# Patient Record
Sex: MALE | Race: WHITE | NOT HISPANIC OR LATINO | Employment: FULL TIME | URBAN - METROPOLITAN AREA
[De-identification: names, ages, dates, MRNs, and addresses within clinical notes are randomized per-mention and may not be internally consistent; named-entity substitution may affect disease eponyms.]

---

## 2018-08-04 NOTE — PROGRESS NOTES
Date of Office Visit: 08/10/2018  Encounter Provider: Bart Ellis MD  Place of Service: [facility] CARDIOLOGY  Patient Name: Bob Guerrero  :1970    Chief Complaint   Patient presents with   • Chest Pain   :     HPI: Bob Guerrero is a 48 y.o. male who presents today in consultation for chest pain at the request of Dr. Saeed.      He denies any history of CAD or CHF.  He had SVT as a child, and underwent ablation in  followed by a repeat ablation in  (he was told that he had four separate foci, and that only two were addressed the first time).  He has not had any SVT since then; he has rare isolated palpitations.    He denies any family history of heart disease.  He has hyperlipidemia but is untreated.  He was started on lisinopril for elevated blood pressure but had to stop it due to fatigue.    His biggest issue is chronic fatigue.  He is sedentary, and obese.  He does not smoke.  He has had intermittent chest tightness, which is not related to exertion.  He can't tell if it's due to a recent bronchitis or GERD.  He has exertional dyspnea which is mild-moderate.  He denies edema, orthopnea, or PND.  He denies syncope or near syncope.     Past Medical History:   Diagnosis Date   • Acid reflux    • Hyperlipidemia    • Morbid obesity with BMI of 40.0-44.9, adult (CMS/[facility])    • SVT (supraventricular tachycardia) (CMS/[facility])     s/p ablation in  and        Past Surgical History:   Procedure Laterality Date   • ABLATION OF DYSRHYTHMIC FOCUS     • TONSILLECTOMY         Social History     Social History   • Marital status:      Spouse name: N/A   • Number of children: N/A   • Years of education: N/A     Occupational History   • Not on file.     Social History Main Topics   • Smoking status: Never Smoker   • Smokeless tobacco: Never Used      Comment: caffeine use: 4-5 cups daily and soft drinks throughout the day.    • Alcohol use Yes      Comment:  "occ   • Drug use: No   • Sexual activity: No     Other Topics Concern   • Not on file     Social History Narrative   • No narrative on file       Family History   Problem Relation Age of Onset   • Diabetes Mother    • No Known Problems Father        Review of Systems   Constitution: Positive for malaise/fatigue.   Cardiovascular: Positive for chest pain and dyspnea on exertion. Negative for palpitations.   Endocrine: Positive for polyuria.   Skin: Positive for rash.   Musculoskeletal: Positive for myalgias.   Genitourinary: Positive for frequency.   Neurological: Positive for excessive daytime sleepiness.   All other systems reviewed and are negative.      No Known Allergies      Current Outpatient Prescriptions:   •  omeprazole (priLOSEC) 20 MG capsule, Take 20 mg by mouth Daily., Disp: , Rfl:       Objective:     Vitals:    08/10/18 1004   BP: 130/86   BP Location: Left arm   Pulse: 84   Weight: 112 kg (247 lb 3.2 oz)   Height: 165.1 cm (65\")     Body mass index is 41.14 kg/m².    Physical Exam   Constitutional: He is oriented to person, place, and time.   Obese   HENT:   Head: Normocephalic.   Nose: Nose normal.   Mouth/Throat: Oropharynx is clear and moist.   Eyes: Pupils are equal, round, and reactive to light. Conjunctivae and EOM are normal.   Neck: Normal range of motion.   Cannot assess for JVD due to habitus   Cardiovascular: Normal rate, regular rhythm, normal heart sounds and intact distal pulses.    No murmur heard.  Pulmonary/Chest: Effort normal.   Abdominal: Soft.   Obesity limits abdominal exam   Musculoskeletal: Normal range of motion. He exhibits no edema.   Neurological: He is alert and oriented to person, place, and time. No cranial nerve deficit.   Skin: Skin is warm and dry. No rash noted.   Psychiatric: He has a normal mood and affect. His behavior is normal. Judgment and thought content normal.   Vitals reviewed.        ECG 12 Lead  Date/Time: 8/10/2018 11:18 AM  Performed by: JILLIAN CAI " D  Authorized by: BART ELLIS   Comparison: not compared with previous ECG   Previous ECG: no previous ECG available  Rhythm: sinus rhythm  Conduction: conduction normal  ST Segments: ST segments normal  T Waves: T waves normal  QRS axis: normal  Other: no other findings                Assessment:       Diagnosis Plan   1. Chest discomfort  Treadmill Stress Test   2. Fatigue, unspecified type     3. History of paroxysmal supraventricular tachycardia     4. Elevated blood pressure reading without diagnosis of hypertension            Plan:       Overall, his chest discomfort is atypical.  He has generalized fatigue but is sedentary.  He does have significant hyperlipidemia, and I encouraged him to discuss this with Dr. Saeed as I feel he would benefit from a statin.  I had him complete a treadmill stress today, and he exercised for 9.5 minutes without EKG changes or chest pain.  His dyspnea was appropriate for the degree of exertion.  I do not feel he needs further cardiac evaluation.    His BP is minimally elevated today, but has been significantly higher in the past.  He stopped lisinopril due to fatigue; I'll defer this to Dr. Saeed.    He's had no evidence of recurrent SVT and is most likely cured s/p ablation.     Sincerely,       Bart Ellis MD

## 2018-08-10 ENCOUNTER — OFFICE VISIT (OUTPATIENT)
Dept: CARDIOLOGY | Facility: CLINIC | Age: 48
End: 2018-08-10

## 2018-08-10 ENCOUNTER — HOSPITAL ENCOUNTER (OUTPATIENT)
Dept: CARDIOLOGY | Facility: HOSPITAL | Age: 48
Discharge: HOME | End: 2018-08-10
Attending: INTERNAL MEDICINE | Admitting: INTERNAL MEDICINE

## 2018-08-10 VITALS
BODY MASS INDEX: 41.19 KG/M2 | HEART RATE: 84 BPM | HEIGHT: 65 IN | WEIGHT: 247.2 LBS | DIASTOLIC BLOOD PRESSURE: 86 MMHG | SYSTOLIC BLOOD PRESSURE: 130 MMHG

## 2018-08-10 DIAGNOSIS — R07.89 CHEST DISCOMFORT: ICD-10-CM

## 2018-08-10 DIAGNOSIS — R03.0 ELEVATED BLOOD PRESSURE READING WITHOUT DIAGNOSIS OF HYPERTENSION: ICD-10-CM

## 2018-08-10 DIAGNOSIS — Z86.79 HISTORY OF PAROXYSMAL SUPRAVENTRICULAR TACHYCARDIA: ICD-10-CM

## 2018-08-10 DIAGNOSIS — R53.83 FATIGUE, UNSPECIFIED TYPE: ICD-10-CM

## 2018-08-10 DIAGNOSIS — R07.89 CHEST DISCOMFORT: Primary | ICD-10-CM

## 2018-08-10 LAB
BH CV STRESS BP STAGE 1: NORMAL
BH CV STRESS BP STAGE 2: NORMAL
BH CV STRESS BP STAGE 3: NORMAL
BH CV STRESS DURATION MIN STAGE 1: 3
BH CV STRESS DURATION MIN STAGE 2: 3
BH CV STRESS DURATION MIN STAGE 3: 3
BH CV STRESS DURATION SEC STAGE 1: 0
BH CV STRESS DURATION SEC STAGE 2: 0
BH CV STRESS DURATION SEC STAGE 3: 0
BH CV STRESS DURATION SEC STAGE 4: 30
BH CV STRESS GRADE STAGE 1: 10
BH CV STRESS GRADE STAGE 2: 12
BH CV STRESS GRADE STAGE 3: 14
BH CV STRESS GRADE STAGE 4: 16
BH CV STRESS HR STAGE 1: 124
BH CV STRESS HR STAGE 2: 136
BH CV STRESS HR STAGE 3: 164
BH CV STRESS HR STAGE 4: 169
BH CV STRESS METS STAGE 1: 5
BH CV STRESS METS STAGE 2: 7.5
BH CV STRESS METS STAGE 3: 10
BH CV STRESS METS STAGE 4: 13.5
BH CV STRESS PROTOCOL 1: NORMAL
BH CV STRESS RECOVERY BP: NORMAL MMHG
BH CV STRESS RECOVERY HR: 110 BPM
BH CV STRESS SPEED STAGE 1: 1.7
BH CV STRESS SPEED STAGE 2: 2.5
BH CV STRESS SPEED STAGE 3: 3.4
BH CV STRESS SPEED STAGE 4: 4.2
BH CV STRESS STAGE 1: 1
BH CV STRESS STAGE 2: 2
BH CV STRESS STAGE 3: 3
BH CV STRESS STAGE 4: 4
MAXIMAL PREDICTED HEART RATE: 173 BPM
PERCENT MAX PREDICTED HR: 97.69 %
STRESS BASELINE BP: NORMAL MMHG
STRESS BASELINE HR: 86 BPM
STRESS PERCENT HR: 115 %
STRESS POST ESTIMATED WORKLOAD: 10 METS
STRESS POST EXERCISE DUR MIN: 9 MIN
STRESS POST EXERCISE DUR SEC: 30 SEC
STRESS POST PEAK BP: NORMAL MMHG
STRESS POST PEAK HR: 169 BPM
STRESS TARGET HR: 147 BPM

## 2018-08-10 PROCEDURE — 93000 ELECTROCARDIOGRAM COMPLETE: CPT | Performed by: INTERNAL MEDICINE

## 2018-08-10 PROCEDURE — 99244 OFF/OP CNSLTJ NEW/EST MOD 40: CPT | Performed by: INTERNAL MEDICINE

## 2018-08-10 PROCEDURE — 93017 CV STRESS TEST TRACING ONLY: CPT

## 2018-08-10 PROCEDURE — 93018 CV STRESS TEST I&R ONLY: CPT | Performed by: INTERNAL MEDICINE

## 2018-08-10 PROCEDURE — 93016 CV STRESS TEST SUPVJ ONLY: CPT | Performed by: INTERNAL MEDICINE

## 2018-10-02 ENCOUNTER — OFFICE VISIT (OUTPATIENT)
Dept: SLEEP MEDICINE | Facility: HOSPITAL | Age: 48
End: 2018-10-02
Attending: INTERNAL MEDICINE

## 2018-10-02 VITALS
SYSTOLIC BLOOD PRESSURE: 139 MMHG | BODY MASS INDEX: 41.15 KG/M2 | HEART RATE: 101 BPM | WEIGHT: 247 LBS | HEIGHT: 65 IN | DIASTOLIC BLOOD PRESSURE: 84 MMHG | OXYGEN SATURATION: 95 %

## 2018-10-02 DIAGNOSIS — R06.83 SNORING: Primary | ICD-10-CM

## 2018-10-02 DIAGNOSIS — G47.33 OSA (OBSTRUCTIVE SLEEP APNEA): ICD-10-CM

## 2018-10-02 DIAGNOSIS — E66.01 MORBID OBESITY (HCC): ICD-10-CM

## 2018-10-02 DIAGNOSIS — F51.04 CHRONIC INSOMNIA: ICD-10-CM

## 2018-10-02 PROCEDURE — G0463 HOSPITAL OUTPT CLINIC VISIT: HCPCS

## 2018-10-02 NOTE — PROGRESS NOTES
[facility]- Sleep Disorders Center      Patient Care Team:  Everett Saeed MD as PCP - General (Family Medicine)    Referring Provider: Everett Saeed MD    Chief complaint:   Daytime fatigue and loud snoring    History of present illness:     this is a 48-year-old male patient with history of acid reflux who presented for evaluation of daytime fatigue and snoring.  He also had issues with her throat upon awakening and was recently seen by ENT who advised him to get tested for sleep apnea.    Patient's main complain are fatigue and trouble falling asleep.    He acknowledged loud snoring which sometimes awakens him and disturb his wife.  He reported awakening occasionally choking and gasping.  He also stated that he wakes up with a dry mouth and usually experience acid reflux at night but he always have attributed it to his diet.  He grinds his teeth at night.  He denies symptoms of restless leg syndrome.    Sleep schedule:  -Bedtime: 11 PM  -Sleep latency: couple of hours. He has mindracing and sometimes uses his cell phone. Sometimes he takes Melatonin 3mg which helps.   -Wake up time: 8:30 AM weekdays and 9 AM weekend , does not feel refreshed  -Nocturnal awakenin-2 times because of nocturia.  No difficulties going back to sleep.  -Perceived sleep hours: 5-7    ESS: Total score: 7     He's gained about 15 pounds over the last 5 years.  He works as a CPA.  He does not exercise regularly and does not watch his diet.    Review of Systems  Constitutional: No fever or chills. No changes in appetite.   ENMT: No sinus congestion or hoarsness but reported postnasal drip  Cardiovascular: No chest pain, palpitation or legs swelling.    Respiratory: No dyspnea or wheezing but reported intermittent cough  Gastrointestinal: No constipation, diarrhea, abdominal pain but acid reflux  Neurology: No headache, weakness, numbness or dizziness.   Musculoskeletal: No joints pain,  "stiffness or swelling.   Psychiatry: No depression, anxiety or irritability.   Hem/Lymphatic: No swollen glands or easy bruising.  Integumentary: No rash.  Endocrinology: No excessive thirst, cold or warm intolerance.   Urinary: No dysuria or bloody urine but frequent urination.     History  Past Medical History:   Diagnosis Date   • Acid reflux    • Hyperlipidemia    • Morbid obesity with BMI of 40.0-44.9, adult (CMS/[facility])    • SVT (supraventricular tachycardia) (CMS/[facility])     s/p ablation in 1996 and 1997   ,   Past Surgical History:   Procedure Laterality Date   • ABLATION OF DYSRHYTHMIC FOCUS     • TONSILLECTOMY     ,   Family History   Problem Relation Age of Onset   • Diabetes Mother    • No Known Problems Father    ,   Social History   Substance Use Topics   • Smoking status: Never Smoker   • Smokeless tobacco: Never Used      Comment: caffeine use: 4-5 cups daily and soft drinks throughout the day.    • Alcohol use Yes      Comment: occ    and Allergies:  Patient has no known allergies.    Medications:    Current Outpatient Prescriptions:   •  omeprazole (priLOSEC) 20 MG capsule, Take 20 mg by mouth Daily., Disp: , Rfl:       Vital Signs:  Vitals:    10/02/18 1619   BP: 139/84   BP Location: Left arm   Patient Position: Sitting   Pulse: 101   SpO2: 95%   Weight: 112 kg (247 lb)   Height: 165.1 cm (65\")     Body mass index is 41.1 kg/m².  Neck Circumference: 18 inches     Physical Exam:  Neck Circumference: 18 inches     Constitutional: Not in acute distress.  Eyes: Injected conjunctiva, EOMI.  ENMT: Rich score 3. Mallampati score 4. No oral thrush. Tonsils grade 1. Large tongue.  Neck: Large. No thyromegaly.    Heart: Regular rhythm and rate, no murmur  Lungs: Good and equal air entry bilaterally. No crackles or wheezing.         Abdomen: Obese.   Extremities: No cyanosis, clubbing or pitting edema. Moves all extremities.  Neuro: Conscious, alert, oriented x3.   Psych: Appropriate mood and affect.  "   Integumentary: No rash  Lymphatic: No palpable cervical or supraclavicular lymph nodes.    Diagnostic data:  Treadmill stress test on 8/10/18 was negative for ischemia       Assessment:  Diagnoses and all orders for this visit:    Snoring    Morbid obesity (CMS/HCC)    Chronic insomnia      Patient is at high risk for obstructive sleep apnea however he has comorbid insomnia and also morbid obesity which limit the utility of home sleep apnea testing.    Plan:    Check a polysomnography (split protocol).  Patient is not a good candidate for home sleep apnea testing due to comorbid insomnia and morbid obesity.  I explained the pathology of sleep apnea to the patient, testing and therapy which mainly include CPAP.  Patient is agreeable with CPAP treatment if needed.    We discussed sleep hygiene at length.  Advised the patient to avoid going to bed when not feeling sleepy and could not stay in bed more than 20 minutes if unable to fall asleep.  He has both psychophysiological and inadequate sleep hygiene and insomnia.  We talked about relaxation techniques and avoiding electronic/TV in bed.    I counseled the patient for weight loss.  I informed him that losing weight may decrease the severity of sleep apnea and alveolar the need of CPAP treatment.  I encouraged him to exercise daily and watch his diet.        Delonte Jamison MD  10/02/18  4:38 PM    This note was dictated utilizing Dragon dictation

## 2018-10-26 ENCOUNTER — HOSPITAL ENCOUNTER (OUTPATIENT)
Dept: SLEEP MEDICINE | Facility: HOSPITAL | Age: 48
Discharge: HOME | End: 2018-10-26
Attending: INTERNAL MEDICINE | Admitting: INTERNAL MEDICINE

## 2018-10-26 DIAGNOSIS — G47.33 OSA (OBSTRUCTIVE SLEEP APNEA): ICD-10-CM

## 2018-10-26 PROCEDURE — 95810 POLYSOM 6/> YRS 4/> PARAM: CPT

## 2018-12-27 ENCOUNTER — TELEPHONE (OUTPATIENT)
Dept: SLEEP MEDICINE | Facility: HOSPITAL | Age: 48
End: 2018-12-27

## 2018-12-27 NOTE — TELEPHONE ENCOUNTER
Spoke with patient about sleep study results, sending order to [facility], follow up scheduled 2/12/2019

## 2019-02-12 ENCOUNTER — APPOINTMENT (OUTPATIENT)
Dept: SLEEP MEDICINE | Facility: HOSPITAL | Age: 49
End: 2019-02-12
Attending: INTERNAL MEDICINE

## 2019-02-26 ENCOUNTER — APPOINTMENT (OUTPATIENT)
Dept: SLEEP MEDICINE | Facility: HOSPITAL | Age: 49
End: 2019-02-26
Attending: INTERNAL MEDICINE

## 2019-03-12 ENCOUNTER — OFFICE VISIT (OUTPATIENT)
Dept: NEUROLOGY | Facility: CLINIC | Age: 49
End: 2019-03-12

## 2019-03-12 ENCOUNTER — APPOINTMENT (OUTPATIENT)
Dept: SLEEP MEDICINE | Facility: HOSPITAL | Age: 49
End: 2019-03-12
Attending: INTERNAL MEDICINE

## 2019-03-12 ENCOUNTER — OFFICE VISIT (OUTPATIENT)
Dept: SLEEP MEDICINE | Facility: HOSPITAL | Age: 49
End: 2019-03-12
Attending: INTERNAL MEDICINE

## 2019-03-12 VITALS
DIASTOLIC BLOOD PRESSURE: 82 MMHG | HEIGHT: 65 IN | HEART RATE: 100 BPM | WEIGHT: 250 LBS | SYSTOLIC BLOOD PRESSURE: 143 MMHG | OXYGEN SATURATION: 96 % | BODY MASS INDEX: 41.65 KG/M2

## 2019-03-12 VITALS
OXYGEN SATURATION: 98 % | HEIGHT: 65 IN | DIASTOLIC BLOOD PRESSURE: 84 MMHG | HEART RATE: 93 BPM | SYSTOLIC BLOOD PRESSURE: 134 MMHG | WEIGHT: 250 LBS | BODY MASS INDEX: 41.65 KG/M2

## 2019-03-12 DIAGNOSIS — R20.2 PARESTHESIA OF LEFT ARM AND LEG: ICD-10-CM

## 2019-03-12 DIAGNOSIS — M54.18 LEFT SACRAL RADICULOPATHY: ICD-10-CM

## 2019-03-12 DIAGNOSIS — R53.82 CHRONIC FATIGUE: Primary | ICD-10-CM

## 2019-03-12 DIAGNOSIS — G47.33 OBSTRUCTIVE SLEEP APNEA, ADULT: Primary | ICD-10-CM

## 2019-03-12 PROCEDURE — G0463 HOSPITAL OUTPT CLINIC VISIT: HCPCS

## 2019-03-12 PROCEDURE — 99204 OFFICE O/P NEW MOD 45 MIN: CPT | Performed by: PSYCHIATRY & NEUROLOGY

## 2019-03-12 RX ORDER — ALPRAZOLAM 0.5 MG/1
TABLET ORAL
Qty: 2 TABLET | Refills: 0 | Status: SHIPPED | OUTPATIENT
Start: 2019-03-12 | End: 2020-07-18

## 2019-03-12 RX ORDER — CIPROFLOXACIN 500 MG/1
TABLET, FILM COATED ORAL
COMMUNITY
Start: 2019-03-09 | End: 2020-07-18

## 2019-03-12 NOTE — PROGRESS NOTES
[facility]- Sleep Disorders Center                          Chief Complaint:   Follow up for obstructive sleep apnea, obesity and insomnia    History of present illness:   Subjective    Summary  Patient is a 48 y.o. male  with history of December hyperlipidemia and acid reflux.  BMI = 41; Neck size= 18 inches.  He complains of daytime fatigue and trouble falling asleep.  In addition he reported loud snoring, gasping for breath at night and waking up with acid reflux and grinding his teeth.  Baseline ESS = 7.    CJ:  He has mild to moderate disease on his sleep study.  He was started on CPAP in January.  This is his first follow-up on CPAP.  He stated that it helped him feeling a little more refreshed but he continues to have some difficulties with sleep and is taking him a little longer to get adjusted to the machine.  Overall no major issues though.      Sleep schedule:  -Bedtime: 11 PM  -Sleep latency: 45 min to 1 hour  -Wake up time: 8 AM , does feel refreshed  -Nocturnal awakenin-3 times because of nocturia.  With some difficulties going back to sleep.  -Perceived sleep hours: 2    Mask: Nasal pillow which does fit well.  No significant air leak or dry mouth  DME: [employer]    ESS: Total score: 8     Concerning his obesity:  Does not exercise regularly.  He just saw a neurologist today for fatigue and weakness and he was referred to rheumatology for possible autoimmune conditions.  He does not watch his diet..  Weight is stable.    REVIEW OF SYSTEMS:   HEENT: No nasal congestion but postnasal drip   CARDIOVASCULAR: No chest pain, chest pressure or chest discomfort. No palpitations or edema.   RESPIRATORY: No shortness of breath but mild cough.   GASTROINTESTINAL: No abdominal bloating but acid reflux   NEUROLOGICAL/PSYCHOATRY: No depression nor anxiety    Past Medical History:  Past Medical History:   Diagnosis Date   • Acid reflux    • Hyperlipidemia    • Morbid  "obesity with BMI of 40.0-44.9, adult (CMS/[facility])    • SVT (supraventricular tachycardia) (CMS/[facility])     s/p ablation in 1996 and 1997   ,   Past Surgical History:   Procedure Laterality Date   • ABLATION OF DYSRHYTHMIC FOCUS     • TONSILLECTOMY     ,   Social History     Tobacco Use   • Smoking status: Never Smoker   • Smokeless tobacco: Never Used   • Tobacco comment: caffeine use: 4-5 cups daily and soft drinks throughout the day.    Substance Use Topics   • Alcohol use: Yes     Comment: occ   • Drug use: No    and Allergies:  Patient has no known allergies.    Medication Review:     Current Outpatient Medications:   •  ALPRAZolam (XANAX) 0.5 MG tablet, Take 1 tablet 1 hour before MRI.  May repeat during MRI., Disp: 2 tablet, Rfl: 0  •  ciprofloxacin (CIPRO) 500 MG tablet, , Disp: , Rfl:       Objective   Vital Signs:  Vitals:    03/12/19 1308   BP: 143/82   Pulse: 100   SpO2: 96%   Weight: 113 kg (250 lb)   Height: 165.1 cm (65\")     Body mass index is 41.6 kg/m².          Physical Exam:   General Appearance:    Alert, cooperative, in no acute distress   ENMT:  Rich score 3. Mallampati score 4. No oral thrush. Tonsils grade 1. Large tongue.   Neck:  Large. Trachea midline. No thyromegaly.   Lungs:     Clear to auscultation,respirations regular, even and                  unlabored    Heart:    Regular rhythm and normal rate, normal S1 and S2, no            Murmur.   Abdomen:     Obese.  Soft.  No tenderness.  No HSM    Neuro:   Conscious, alert, oriented x3. Appropriate mood and affect.    Extremities:   Moves all extremities well, no edema, no cyanosis, no             Redness              Diagnostic data:  Polysomnography was performed on: 10/27/18  Eddie SpO2 was 69% with hypoxic burden of 13.7 min (4.5% of TST).       Total Supine Non Supine REM   AHI 14.7 14.8 12.6 48.8   RDI 16.7 16.9 12.6 48.8      Periodic limb movements did occur. PLMI was 1.4/h and PLM-AI was 0/h.  Arousal index was 21 events/h " predominantly related to spontanous events.     CPAP download showed:  Date: 30 days  Usage (days): 83 %  Days used>4h: 80 %  AHI: 1.4/h  Leak: 6.5  Usage: 4 h and 23 min  P 95% : 8.9  Auto CPAP: 5-18 cm H2O    Assessment   1. CJ on CPAP  2. Morbid obesity (BMI 41)  3. Insomnia  4. Inadequate sleep hygiene        PLAN:  Discussed the results of the sleep study and the download.  He is compliant with therapy and clinically benefit from treatment.  He was encouraged to continue using his CPAP.  Refill supplies on time.  RTC in 1 year.    Discussed sleep hygiene again and advised him to avoid watching TV or use any electronics in bed.  Avoid staying in bed more than 20 minutes if unable to fall asleep.  Does not want to use sleep aid which I did not offer either but I did recommend to use over-the-counter long-acting melatonin such as REMFresh.      Counseled for weight loss              This note was dictated utilizing [employer] dictation

## 2019-03-12 NOTE — PROGRESS NOTES
"Subjective:     Patient ID: Bob Guerrero is a 48 y.o. male.    History of Present Illness  The following portions of the patient's history were reviewed and updated as appropriate: allergies, current medications, past family history, past medical history, past social history, past surgical history and problem list.    Multiple problems reviewed:  Fatigue-this is been going on for many months sometimes causing him to leave work early.  It may be related to sleep apnea but he has been wearing a mask since October without much improvement.    Vision changes: Poorly delineated.  He states his eyes are \"getting worse\" for a few months but it is bilateral.  He has myopia.  2 weeks ago he had a episode of blurred vision where it was hard for him to read the computer screen lasting about an hour.  No other vision change associated in the past of this type and no migraine.  No monocular visual loss.    Chronic sinus condition being treated with multiple rounds of antibiotics causing occasional left facial pain.  Never had any x-rays for this problem    Left sciatica-first evaluated by neurosurgery many years ago with an MRI.  He still has occasional tingling in the left foot from this problem.        Some dizziness for 4 weeks.  Very nonspecific but sounds orthostatic.  The symptoms occur when he stands.  He is not on hypertensive medicines and no new medicines.  He denies true vertigo diplopia slurred speech.    Aches and limbs-10 years ago he had the onset of occasional aching in the limbs.  Not clearly identified as a myalgia or paresthesia.  It will go on for months at a time and then resolve.  He felt pretty bad from this condition last summer.  A lab test showed a sedimentation rate of 2 but no other rheumatologic studies.  No definite joint pain noted.    Prostatitis-this is been a problem off and on since 2008.  He has taken Levaquin and/or Cipro at various times.  He notes nocturnal frequency associated with " this.    Cardiac ablation in [id number] for a history of SVT.  Follow-up with cardiology last August showed that all was well.    Lab review from last June showed normal sedimentation rate and CBC cholesterol 231.  Normal SPE    He has a review of systems of sleep apnea diagnosed last October, gastric reflux disease, hyperlipidemia, obesity.  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  Patient has a friend with multiple sclerosis.  Once he started having fatigue and some dizziness he thought he should be looked over by neurology to make sure he did not have MS.  Review of Systems   Constitutional: Positive for fatigue. Negative for activity change and appetite change.   HENT: Positive for ear pain. Negative for facial swelling and trouble swallowing.    Eyes: Negative for photophobia, pain and visual disturbance.   Respiratory: Positive for apnea, cough and chest tightness. Negative for choking and shortness of breath.    Cardiovascular: Negative for chest pain, palpitations and leg swelling.   Gastrointestinal: Positive for constipation. Negative for abdominal pain and nausea.   Endocrine: Negative for polydipsia, polyphagia and polyuria.   Genitourinary: Positive for frequency, hematuria and urgency. Negative for difficulty urinating.   Musculoskeletal: Positive for arthralgias, back pain and joint swelling. Negative for gait problem and neck pain.   Skin: Negative for color change, rash and wound.   Allergic/Immunologic: Positive for environmental allergies. Negative for food allergies and immunocompromised state.   Neurological: Positive for dizziness, weakness, light-headedness and numbness. Negative for tremors, seizures, syncope, facial asymmetry, speech difficulty and headaches.   Hematological: Negative for adenopathy. Does not bruise/bleed easily.   Psychiatric/Behavioral: Positive for decreased concentration and sleep disturbance. Negative for agitation, behavioral  problems, confusion, dysphoric mood, hallucinations, self-injury and suicidal ideas. The patient is not nervous/anxious and is not hyperactive.         Objective:    Neurologic Exam  This patient was well-developed, well-nourished and in no acute distress.      GAIT:  Gait, station, heel, toe and tandem walk were normal.  There was no drift of the arms.  Romberg´s sign was not present.      VASCULAR: The carotid arteries had normal upstroke to palpation without bruits.  The vertebral arteries were without bruits.  The radial pulses were equal and without delay.  Pedal pulses were normal.  The extremities were without cyanosis, clubbing or edema.    MENTAL STATUS: This patient was alert and oriented to person, place, time and situation.  Recent and remote memory -  intact.  Attention span, fund of knowledge and concentration were normal.  Comprehension, naming, reading, repetition, and language were normal.  Fund of knowledge was intact.    CRANIAL NERVES:  Olfaction-not tested.  Visual fields full in all quadrants to confrontation.  The pupils were equally round and reactive to light at 5 mm.  There was no evidence of a [place] pupil.  Funduscopic exam revealed no papilledema, hemorrhage or exudate.  The gaze was conjugate. The vertical and horizontal eye movements were full without nystagmus.  There was no facial weakness.  There was no facial sensory loss to pinprick bilaterally.  Hearing was normal for light finger rub and casual speech.  Speech is normal without dysarthria.  The neck is supple and strength is normal in rotation, flexion and extension.  Tongue and palate movements are normal.    MOTOR UPPER EXTREMTIES:   Bilaterally the deltoid, biceps, triceps, wrist extensors, intrinsic hand muscles, and  were grade 5 and normal.  Bulk, tone and strength of these muscles were normal without abnormal movements.  No atrophy and no muscle aches noted in the arms.    MOTOR LOWER EXTREMITIES: Bilaterally  the hip flexors, hip abductors, knee flexors and extensors, ankle plantar flexors and dorsiflexors were grade 5 with normal. Bulk, tone and strength of these muscles were normal without abnormal movements.  No atrophy or muscle aches noted in the legs.    DEEP TENDON REFLEXES:  Bilateral biceps, triceps, and brachioradialis reflexes were grade 1 symmetric.   Lower extremity reflexes were symmetric grade 1 at the knees and hamstrings.  However at the ankle they were asymmetric: Grade 2 right AJ versus grade 1 left AJ.  The plantar responses were downgoing.  Ankle clonus was not present.    CEREBELLAR:  Finger to nose, rapid finger opposition, and heel-to-shin tests were performed normally, bilaterally.    MUSCULOSKELETAL:  Normal range of motion of the neck is noted.  There was no back pain with straight leg raise.  Internal and external rotation of the hips was normal.     SENSORY: There was normal upper and lower extremity sensation to vibration, proprioception, and pinprick, with the following exceptions: He thought the pinprick was slightly less pronounced over the left face especially over the zygomatic region and also for the left arm but not for the left leg or trunk    HIGHER CORTICAL FUNCTION: There were no aphasic or apraxic errors.  Visual fields were intact to confrontation.      Physical Exam   Constitutional: He appears well-developed and well-nourished.   Neck: Normal range of motion. Neck supple.   Cardiovascular: Normal rate.   Pulmonary/Chest: Effort normal.   Psychiatric:   Behavior judgment and thought content were all normal.  Mildly anxious over many health issues but no definite stressors at home.   Nursing note and vitals reviewed.      Assessment/Plan:       Problems Addressed this Visit        Unprioritized    Chronic fatigue - Primary    Paresthesia of left arm and leg    Relevant Orders    MRI Brain With & Without Contrast    Left sacral radiculopathy           The chronic fatigue and  aches will require a rheumatology evaluation and I have recommended that he discuss this with Dr. Pizarro    I do not think he has MS but the fatigue and left-sided paresthesias in the face and arm deserve a brain MRI to rule out this condition or rule out a small lacunar infarction.  He has several vascular risk factors noted.    The decreased left ankle reflex and occasion again the left foot is consistent with left S1 radiculopathy that is quite chronic.    His urinary symptoms do not sound like they are due to spinal cord issues and I encouraged him to see Dr. Ayala again    Mr. Guerrero will be scheduled for brain [facility] with and without contrast to look for stroke or demyelinating disease.  Because of his claustrophobia issues I have prescribed Xanax.

## 2019-04-12 ENCOUNTER — HOSPITAL ENCOUNTER (OUTPATIENT)
Dept: MRI IMAGING | Facility: HOSPITAL | Age: 49
Discharge: HOME | End: 2019-04-12
Admitting: PSYCHIATRY & NEUROLOGY

## 2019-04-12 DIAGNOSIS — R20.2 PARESTHESIA OF LEFT ARM AND LEG: ICD-10-CM

## 2019-04-12 PROCEDURE — 0 GADOBENATE DIMEGLUMINE 529 MG/ML SOLUTION: Performed by: PSYCHIATRY & NEUROLOGY

## 2019-04-12 PROCEDURE — 70553 MRI BRAIN STEM W/O & W/DYE: CPT

## 2019-04-12 PROCEDURE — A9577 INJ MULTIHANCE: HCPCS | Performed by: PSYCHIATRY & NEUROLOGY

## 2019-04-12 RX ADMIN — GADOBENATE DIMEGLUMINE 20 ML: 529 INJECTION, SOLUTION INTRAVENOUS at 21:45

## 2019-04-17 ENCOUNTER — TELEPHONE (OUTPATIENT)
Dept: NEUROLOGY | Facility: CLINIC | Age: 49
End: 2019-04-17

## 2019-04-17 NOTE — TELEPHONE ENCOUNTER
----- Message from Nallely Wilkins sent at 4/17/2019  1:36 PM EDT -----  Contact: [phone]  Patient is calling regarding his MRI results.

## 2019-04-18 ENCOUNTER — TELEPHONE (OUTPATIENT)
Dept: NEUROLOGY | Facility: CLINIC | Age: 49
End: 2019-04-18

## 2019-04-18 NOTE — TELEPHONE ENCOUNTER
I returned a phone call and had to leave a message for Cale that the MRI was normal.  I also wanted to make sure that he did have follow-up with his urologist and reiterated that recommendation.

## 2019-04-18 NOTE — TELEPHONE ENCOUNTER
I called and left a message that the MRI was normal and that he needed to follow-up with his urologist

## 2020-07-18 ENCOUNTER — E-VISIT (OUTPATIENT)
Dept: FAMILY MEDICINE CLINIC | Facility: TELEHEALTH | Age: 50
End: 2020-07-18

## 2020-07-18 DIAGNOSIS — R09.82 POST-NASAL DRAINAGE: ICD-10-CM

## 2020-07-18 DIAGNOSIS — R51.9 HEADACHE, UNSPECIFIED HEADACHE TYPE: Primary | ICD-10-CM

## 2020-07-18 PROCEDURE — 99422 OL DIG E/M SVC 11-20 MIN: CPT | Performed by: PHYSICIAN ASSISTANT

## 2020-07-18 NOTE — PATIENT INSTRUCTIONS
Go to the Urgent Care for further evaluation and  COVID testing.  Quarantine as directed by Urgent Care.  Wear a mask.

## 2020-07-18 NOTE — PROGRESS NOTES
Bob Guerrero    1970  [id number]    I have reviewed the e-Visit questionnaire and patient's answers.  My assessment and plan are as follows:    CC; sinus problems    HISTORY OF PRESENT ILLNESS  HPI   Pt is a 50 yr old m who presents via  Evisit with c/o sinus problems x 5-7 days that he describes as facial pain, headache, ear pain, tenderness/swelling in front of neck, pain/stiffness in back of neck, and watery eyes.  Associated with difficulty seeing or blurry vision, post nasal drainage, and body aches.  Pt denies fever and known contacts or exposure.Pt has been taking decongestants, over the counter pain medication, and nose spray.        Review of Systems   Neg fever, Positive fatigue, body aches, watery eyes, change in vision, ear pain, facial pain, post nasal drainage, headache, neck pain    Physical Exam     No PE performed    Diagnoses and all orders for this visit:    Headache, unspecified headache type    Post-nasal drainage      Go to the Urgent Care for further evaluation and  COVID testing.  Quarantine as directed by Urgent Care.  Wear a mask.    Evisit 15 mins    Mindy Herrera PA-C  07/18/20  9:11 AM

## 2022-09-01 ENCOUNTER — OFFICE VISIT (OUTPATIENT)
Dept: FAMILY MEDICINE CLINIC | Facility: CLINIC | Age: 52
End: 2022-09-01

## 2022-09-01 VITALS
BODY MASS INDEX: 41.65 KG/M2 | TEMPERATURE: 98.7 F | WEIGHT: 250 LBS | SYSTOLIC BLOOD PRESSURE: 158 MMHG | HEIGHT: 65 IN | OXYGEN SATURATION: 99 % | HEART RATE: 88 BPM | DIASTOLIC BLOOD PRESSURE: 100 MMHG

## 2022-09-01 DIAGNOSIS — I10 ESSENTIAL HYPERTENSION: Primary | ICD-10-CM

## 2022-09-01 DIAGNOSIS — Z12.11 ENCOUNTER FOR SCREENING FOR MALIGNANT NEOPLASM OF COLON: ICD-10-CM

## 2022-09-01 DIAGNOSIS — M79.622 LEFT UPPER ARM PAIN: ICD-10-CM

## 2022-09-01 PROBLEM — R03.0 ELEVATED BLOOD-PRESSURE READING WITHOUT DIAGNOSIS OF HYPERTENSION: Status: ACTIVE | Noted: 2022-09-01

## 2022-09-01 PROBLEM — R53.83 MALAISE AND FATIGUE: Status: ACTIVE | Noted: 2019-11-06

## 2022-09-01 PROBLEM — H04.123 DRY EYES: Status: ACTIVE | Noted: 2019-11-06

## 2022-09-01 PROBLEM — R53.81 MALAISE AND FATIGUE: Status: ACTIVE | Noted: 2019-11-06

## 2022-09-01 PROBLEM — M25.50 ARTHRALGIA: Status: ACTIVE | Noted: 2019-11-06

## 2022-09-01 PROBLEM — R06.09 DYSPNEA ON EXERTION: Status: ACTIVE | Noted: 2019-11-06

## 2022-09-01 LAB
BILIRUB BLD-MCNC: NEGATIVE MG/DL
EXPIRATION DATE: NORMAL
GLUCOSE UR STRIP-MCNC: NEGATIVE MG/DL
KETONES UR QL: NEGATIVE
LEUKOCYTE EST, POC: NEGATIVE
Lab: NORMAL
NITRITE UR-MCNC: NORMAL MG/ML
PH UR: 7.5 [PH] (ref 5–8)
PROT UR STRIP-MCNC: NEGATIVE MG/DL
RBC # UR STRIP: NEGATIVE /UL
SP GR UR: 1.01 (ref 1–1.03)
UROBILINOGEN UR QL: NORMAL

## 2022-09-01 PROCEDURE — 99214 OFFICE O/P EST MOD 30 MIN: CPT | Performed by: FAMILY MEDICINE

## 2022-09-01 PROCEDURE — 81003 URINALYSIS AUTO W/O SCOPE: CPT | Performed by: FAMILY MEDICINE

## 2022-09-01 RX ORDER — LORATADINE 10 MG/1
CAPSULE, LIQUID FILLED ORAL
COMMUNITY
Start: 2022-08-28 | End: 2022-09-26

## 2022-09-01 RX ORDER — LISINOPRIL AND HYDROCHLOROTHIAZIDE 12.5; 1 MG/1; MG/1
1 TABLET ORAL DAILY
Qty: 30 TABLET | Refills: 1 | Status: SHIPPED | OUTPATIENT
Start: 2022-09-01 | End: 2022-09-26

## 2022-09-01 RX ORDER — CYCLOSPORINE 0.5 MG/ML
EMULSION OPHTHALMIC
COMMUNITY
Start: 2022-07-07

## 2022-09-01 RX ORDER — MELATONIN
1000 DAILY
COMMUNITY

## 2022-09-01 NOTE — PATIENT INSTRUCTIONS
"Hypertension, Adult  High blood pressure (hypertension) is when the force of blood pumping through the arteries is too strong. The arteries are the blood vessels that carry blood from the heart throughout the body. Hypertension forces the heart to work harder to pump blood and may cause arteries to become narrow or stiff. Untreated or uncontrolled hypertension can cause a heart attack, heart failure, a stroke, kidney disease, and other problems.  A blood pressure reading consists of a higher number over a lower number. Ideally, your blood pressure should be below 120/80. The first (\"top\") number is called the systolic pressure. It is a measure of the pressure in your arteries as your heart beats. The second (\"bottom\") number is called the diastolic pressure. It is a measure of the pressure in your arteries as the heart relaxes.  What are the causes?  The exact cause of this condition is not known. There are some conditions that result in or are related to high blood pressure.  What increases the risk?  Some risk factors for high blood pressure are under your control. The following factors may make you more likely to develop this condition:  Smoking.  Having type 2 diabetes mellitus, high cholesterol, or both.  Not getting enough exercise or physical activity.  Being overweight.  Having too much fat, sugar, calories, or salt (sodium) in your diet.  Drinking too much alcohol.  Some risk factors for high blood pressure may be difficult or impossible to change. Some of these factors include:  Having chronic kidney disease.  Having a family history of high blood pressure.  Age. Risk increases with age.  Race. You may be at higher risk if you are .  Gender. Men are at higher risk than women before age 45. After age 65, women are at higher risk than men.  Having obstructive sleep apnea.  Stress.  What are the signs or symptoms?  High blood pressure may not cause symptoms. Very high blood pressure " (hypertensive crisis) may cause:  Headache.  Anxiety.  Shortness of breath.  Nosebleed.  Nausea and vomiting.  Vision changes.  Severe chest pain.  Seizures.  How is this diagnosed?  This condition is diagnosed by measuring your blood pressure while you are seated, with your arm resting on a flat surface, your legs uncrossed, and your feet flat on the floor. The cuff of the blood pressure monitor will be placed directly against the skin of your upper arm at the level of your heart. It should be measured at least twice using the same arm. Certain conditions can cause a difference in blood pressure between your right and left arms.  Certain factors can cause blood pressure readings to be lower or higher than normal for a short period of time:  When your blood pressure is higher when you are in a health care provider's office than when you are at home, this is called white coat hypertension. Most people with this condition do not need medicines.  When your blood pressure is higher at home than when you are in a health care provider's office, this is called masked hypertension. Most people with this condition may need medicines to control blood pressure.  If you have a high blood pressure reading during one visit or you have normal blood pressure with other risk factors, you may be asked to:  Return on a different day to have your blood pressure checked again.  Monitor your blood pressure at home for 1 week or longer.  If you are diagnosed with hypertension, you may have other blood or imaging tests to help your health care provider understand your overall risk for other conditions.  How is this treated?  This condition is treated by making healthy lifestyle changes, such as eating healthy foods, exercising more, and reducing your alcohol intake. Your health care provider may prescribe medicine if lifestyle changes are not enough to get your blood pressure under control, and if:  Your systolic blood pressure is above  130.  Your diastolic blood pressure is above 80.  Your personal target blood pressure may vary depending on your medical conditions, your age, and other factors.  Follow these instructions at home:  Eating and drinking    Eat a diet that is high in fiber and potassium, and low in sodium, added sugar, and fat. An example eating plan is called the DASH (Dietary Approaches to Stop Hypertension) diet. To eat this way:  Eat plenty of fresh fruits and vegetables. Try to fill one half of your plate at each meal with fruits and vegetables.  Eat whole grains, such as whole-wheat pasta, brown rice, or whole-grain bread. Fill about one fourth of your plate with whole grains.  Eat or drink low-fat dairy products, such as skim milk or low-fat yogurt.  Avoid fatty cuts of meat, processed or cured meats, and poultry with skin. Fill about one fourth of your plate with lean proteins, such as fish, chicken without skin, beans, eggs, or tofu.  Avoid pre-made and processed foods. These tend to be higher in sodium, added sugar, and fat.  Reduce your daily sodium intake. Most people with hypertension should eat less than 1,500 mg of sodium a day.  Do not drink alcohol if:  Your health care provider tells you not to drink.  You are pregnant, may be pregnant, or are planning to become pregnant.  If you drink alcohol:  Limit how much you use to:  0-1 drink a day for women.  0-2 drinks a day for men.  Be aware of how much alcohol is in your drink. In the [place] one drink equals one 12 oz bottle of beer (355 mL), one 5 oz glass of wine (148 mL), or one 1½ oz glass of hard liquor (44 mL).    Lifestyle    Work with your health care provider to maintain a healthy body weight or to lose weight. Ask what an ideal weight is for you.  Get at least 30 minutes of exercise most days of the week. Activities may include walking, swimming, or biking.  Include exercise to strengthen your muscles (resistance exercise), such as Pilates or lifting weights, as  part of your weekly exercise routine. Try to do these types of exercises for 30 minutes at least 3 days a week.  Do not use any products that contain nicotine or tobacco, such as cigarettes, e-cigarettes, and chewing tobacco. If you need help quitting, ask your health care provider.  Monitor your blood pressure at home as told by your health care provider.  Keep all follow-up visits as told by your health care provider. This is important.    Medicines  Take over-the-counter and prescription medicines only as told by your health care provider. Follow directions carefully. Blood pressure medicines must be taken as prescribed.  Do not skip doses of blood pressure medicine. Doing this puts you at risk for problems and can make the medicine less effective.  Ask your health care provider about side effects or reactions to medicines that you should watch for.  Contact a health care provider if you:  Think you are having a reaction to a medicine you are taking.  Have headaches that keep coming back (recurring).  Feel dizzy.  Have swelling in your ankles.  Have trouble with your vision.  Get help right away if you:  Develop a severe headache or confusion.  Have unusual weakness or numbness.  Feel faint.  Have severe pain in your chest or abdomen.  Vomit repeatedly.  Have trouble breathing.  Summary  Hypertension is when the force of blood pumping through your arteries is too strong. If this condition is not controlled, it may put you at risk for serious complications.  Your personal target blood pressure may vary depending on your medical conditions, your age, and other factors. For most people, a normal blood pressure is less than 120/80.  Hypertension is treated with lifestyle changes, medicines, or a combination of both. Lifestyle changes include losing weight, eating a healthy, low-sodium diet, exercising more, and limiting alcohol.  This information is not intended to replace advice given to you by your health care  provider. Make sure you discuss any questions you have with your health care provider.  Document Revised: 08/28/2019 Document Reviewed: 08/28/2019  [employer] Patient Education © 2021 [employer]  Calorie Counting for Weight Loss  Calories are units of energy. Your body needs a certain number of calories from food to keep going throughout the day. When you eat or drink more calories than your body needs, your body stores the extra calories mostly as fat. When you eat or drink fewer calories than your body needs, your body burns fat to get the energy it needs.  Calorie counting means keeping track of how many calories you eat and drink each day. Calorie counting can be helpful if you need to lose weight. If you eat fewer calories than your body needs, you should lose weight. Ask your health care provider what a healthy weight is for you.  For calorie counting to work, you will need to eat the right number of calories each day to lose a healthy amount of weight per week. A dietitian can help you figure out how many calories you need in a day and will suggest ways to reach your calorie goal.  A healthy amount of weight to lose each week is usually 1-2 lb (0.5-0.9 kg). This usually means that your daily calorie intake should be reduced by 500-750 calories.  Eating 1,200-1,500 calories a day can help most women lose weight.  Eating 1,500-1,800 calories a day can help most men lose weight.  What do I need to know about calorie counting?  Work with your health care provider or dietitian to determine how many calories you should get each day. To meet your daily calorie goal, you will need to:  Find out how many calories are in each food that you would like to eat. Try to do this before you eat.  Decide how much of the food you plan to eat.  Keep a food log. Do this by writing down what you ate and how many calories it had.  To successfully lose weight, it is important to balance calorie counting with a healthy lifestyle  that includes regular activity.  Where do I find calorie information?    The number of calories in a food can be found on a Nutrition Facts label. If a food does not have a Nutrition Facts label, try to look up the calories online or ask your dietitian for help.  Remember that calories are listed per serving. If you choose to have more than one serving of a food, you will have to multiply the calories per serving by the number of servings you plan to eat. For example, the label on a package of bread might say that a serving size is 1 slice and that there are 90 calories in a serving. If you eat 1 slice, you will have eaten 90 calories. If you eat 2 slices, you will have eaten 180 calories.  How do I keep a food log?  After each time that you eat, record the following in your food log as soon as possible:  What you ate. Be sure to include toppings, sauces, and other extras on the food.  How much you ate. This can be measured in cups, ounces, or number of items.  How many calories were in each food and drink.  The total number of calories in the food you ate.  Keep your food log near you, such as in a pocket-sized notebook or on an mariana or website on your mobile phone. Some programs will calculate calories for you and show you how many calories you have left to meet your daily goal.  What are some portion-control tips?  Know how many calories are in a serving. This will help you know how many servings you can have of a certain food.  Use a measuring cup to measure serving sizes. You could also try weighing out portions on a kitchen scale. With time, you will be able to estimate serving sizes for some foods.  Take time to put servings of different foods on your favorite plates or in your favorite bowls and cups so you know what a serving looks like.  Try not to eat straight from a food's packaging, such as from a bag or box. Eating straight from the package makes it hard to see how much you are eating and can lead to  overeating. Put the amount you would like to eat in a cup or on a plate to make sure you are eating the right portion.  Use smaller plates, glasses, and bowls for smaller portions and to prevent overeating.  Try not to multitask. For example, avoid watching TV or using your computer while eating. If it is time to eat, sit down at a table and enjoy your food. This will help you recognize when you are full. It will also help you be more mindful of what and how much you are eating.  What are tips for following this plan?  Reading food labels  Check the calorie count compared with the serving size. The serving size may be smaller than what you are used to eating.  Check the source of the calories. Try to choose foods that are high in protein, fiber, and vitamins, and low in saturated fat, trans fat, and sodium.  Shopping  Read nutrition labels while you shop. This will help you make healthy decisions about which foods to buy.  Pay attention to nutrition labels for low-fat or fat-free foods. These foods sometimes have the same number of calories or more calories than the full-fat versions. They also often have added sugar, starch, or salt to make up for flavor that was removed with the fat.  Make a grocery list of lower-calorie foods and stick to it.  Cooking  Try to cook your favorite foods in a healthier way. For example, try baking instead of frying.  Use low-fat dairy products.  Meal planning  Use more fruits and vegetables. One-half of your plate should be fruits and vegetables.  Include lean proteins, such as chicken, turkey, and fish.  Lifestyle  Each week, aim to do one of the followin minutes of moderate exercise, such as walking.  75 minutes of vigorous exercise, such as running.  General information  Know how many calories are in the foods you eat most often. This will help you calculate calorie counts faster.  Find a way of tracking calories that works for you. Get creative. Try different apps or  programs if writing down calories does not work for you.  What foods should I eat?    Eat nutritious foods. It is better to have a nutritious, high-calorie food, such as an avocado, than a food with few nutrients, such as a bag of potato chips.  Use your calories on foods and drinks that will fill you up and will not leave you hungry soon after eating.  Examples of foods that fill you up are nuts and nut butters, vegetables, lean proteins, and high-fiber foods such as whole grains. High-fiber foods are foods with more than 5 g of fiber per serving.  Pay attention to calories in drinks. Low-calorie drinks include water and unsweetened drinks.  The items listed above may not be a complete list of foods and beverages you can eat. Contact a dietitian for more information.  What foods should I limit?  Limit foods or drinks that are not good sources of vitamins, minerals, or protein or that are high in unhealthy fats. These include:  Candy.  Other sweets.  Sodas, specialty coffee drinks, alcohol, and juice.  The items listed above may not be a complete list of foods and beverages you should avoid. Contact a dietitian for more information.  How do I count calories when eating out?  Pay attention to portions. Often, portions are much larger when eating out. Try these tips to keep portions smaller:  Consider sharing a meal instead of getting your own.  If you get your own meal, eat only half of it. Before you start eating, ask for a container and put half of your meal into it.  When available, consider ordering smaller portions from the menu instead of full portions.  Pay attention to your food and drink choices. Knowing the way food is cooked and what is included with the meal can help you eat fewer calories.  If calories are listed on the menu, choose the lower-calorie options.  Choose dishes that include vegetables, fruits, whole grains, low-fat dairy products, and lean proteins.  Choose items that are boiled, broiled,  grilled, or steamed. Avoid items that are buttered, battered, fried, or served with cream sauce. Items labeled as crispy are usually fried, unless stated otherwise.  Choose water, low-fat milk, unsweetened iced tea, or other drinks without added sugar. If you want an alcoholic beverage, choose a lower-calorie option, such as a glass of wine or light beer.  Ask for dressings, sauces, and syrups on the side. These are usually high in calories, so you should limit the amount you eat.  If you want a salad, choose a garden salad and ask for grilled meats. Avoid extra toppings such as sepulveda, cheese, or fried items. Ask for the dressing on the side, or ask for olive oil and vinegar or lemon to use as dressing.  Estimate how many servings of a food you are given. Knowing serving sizes will help you be aware of how much food you are eating at restaurants.  Where to find more information  Centers for Disease Control and Prevention: www.cdc.gov  [place] Department of Agriculture: myplate.gov  Summary  Calorie counting means keeping track of how many calories you eat and drink each day. If you eat fewer calories than your body needs, you should lose weight.  A healthy amount of weight to lose per week is usually 1-2 lb (0.5-0.9 kg). This usually means reducing your daily calorie intake by 500-750 calories.  The number of calories in a food can be found on a Nutrition Facts label. If a food does not have a Nutrition Facts label, try to look up the calories online or ask your dietitian for help.  Use smaller plates, glasses, and bowls for smaller portions and to prevent overeating.  Use your calories on foods and drinks that will fill you up and not leave you hungry shortly after a meal.  This information is not intended to replace advice given to you by your health care provider. Make sure you discuss any questions you have with your health care provider.  Document Revised: 01/28/2021 Document Reviewed: 01/28/2021  [employer] Patient  Education © 2021 [employer]

## 2022-09-01 NOTE — PROGRESS NOTES
"Chief Complaint  Establish Care and Arm Pain (Left        From ear to arm /)    Subjective        Bob Guerrero presents to [facility]  Arm Pain     Sjogren's syndrome  The patient states he was seen by a rheumatologist at [facility] a few years ago and was diagnosed with Sjogren's syndrome. He is currently using Restasis eye drops due to dry eyes. He had a lip biopsy which showed no inflammation of the salivary glands. Since then, he has been experiencing arthritic symptoms in his hands and feet. He continues to feel fatigued.    Left ear pain  For the past year, he has been experiencing pain in his left ear that radiates down his arm and into the back of his neck. He was seen at urgent care and was told it could be his eustachian tube causing some issues with his ear. He is followed by ear, nose, and throat specialist, Dr. Valverde. Dr. Valverde thought it could be TMJ type symptoms. He was given prednisone in the past which provided relief for 3 to 4 years. When he stopped taking the prednisone, he started feeling \" lousy \" again. The prednisone caused insomnia and indigestion. He has been using a mouth guard and taking ibuprofen. He denies any falls or whiplash injury.    Hypertension  His blood pressure is elevated today. He had a stress test performed a few years ago to rule out any cardiac issues.    Allergies  He has a history of allergies and sinus issues. He has been taking loratadine for his allergies. He was taking Xyzal, but it made him feel extremely drowsy and caused urinary retention. He has not taken it for a few days.    Acid reflux  He has a history of acid reflux. He takes over-the-counter omeprazole.     SVT  He has a history of SVT. He states it will flicker for a few seconds and then he does not. He had an ablation in 1996 and 1997. He states he would have gladly done it again to have gotten rid of the problem because it was getting so bad. He would be " "sitting still and his heart would start racing.     Health maintenance  He has not had a physical or any blood work in a while. He was doing well after he left [facility] and got on Weight Watchers. He gained 25 pounds in 2020. He works from home predominantly. He is a CPA. He does not smoke. He states he overeats.     Family history  His mother  in  from sepsis secondary to cirrhosis from fatty liver. She started experiencing kidney failure and went into hepatorenal syndrome. He does not know his paternal grandparents. His maternal grandmother  in  and his grandfather  in .    Objective   Vital Signs:  /100   Pulse 88   Temp 98.7 °F (37.1 °C)   Ht 165.1 cm (65\")   Wt 113 kg (250 lb)   SpO2 99%   BMI 41.60 kg/m²   Estimated body mass index is 41.6 kg/m² as calculated from the following:    Height as of this encounter: 165.1 cm (65\").    Weight as of this encounter: 113 kg (250 lb).          Physical Exam  Constitutional:       General: He is not in acute distress.     Appearance: He is well-developed. He is obese.   HENT:      Head: Normocephalic and atraumatic.      Comments: Nontender through TMJ.     Right Ear: Tympanic membrane, ear canal and external ear normal.      Left Ear: Tympanic membrane, ear canal and external ear normal.      Nose: Nose normal.      Mouth/Throat:      Mouth: Mucous membranes are moist.      Pharynx: Oropharynx is clear.   Eyes:      Conjunctiva/sclera: Conjunctivae normal.      Pupils: Pupils are equal, round, and reactive to light.   Cardiovascular:      Rate and Rhythm: Normal rate and regular rhythm.      Heart sounds: No murmur heard.  Pulmonary:      Effort: Pulmonary effort is normal. No respiratory distress.      Breath sounds: Normal breath sounds.   Musculoskeletal:      Cervical back: Neck supple.      Right lower leg: No edema.      Left lower leg: No edema.   Lymphadenopathy:      Cervical: No cervical adenopathy.   Neurological:    "   Mental Status: He is alert and oriented to person, place, and time.   Psychiatric:         Mood and Affect: Mood normal.         Behavior: Behavior normal.        Result Review :           ECG 12 Lead    Date/Time: 9/2/2022 12:21 PM  Performed by: Kehrer, Meredith Lea, MD  Authorized by: Kehrer, Meredith Lea, MD   Comparison: not compared with previous ECG   Previous ECG: no previous ECG available  Rhythm: sinus rhythm  Rate: normal  BPM: 84  Conduction: conduction normal  ST Segments: ST segments normal  T Waves: T waves normal  QRS axis: normal  Other: no other findings    Clinical impression: normal ECG              Assessment and Plan   Diagnoses and all orders for this visit:    1. Essential hypertension (Primary)  -     TSH  -     CBC & Differential  -     Comprehensive Metabolic Panel  -     Lipid Panel  -     POC Urinalysis Dipstick, Automated  -     lisinopril-hydrochlorothiazide (Zestoretic) 10-12.5 MG per tablet; Take 1 tablet by mouth Daily.  Dispense: 30 tablet; Refill: 1    2. Left upper arm pain  -     XR Spine Cervical 2 or 3 View; Future    3. Encounter for screening for malignant neoplasm of colon  -     Ambulatory Referral For Screening Colonoscopy    Other orders  -     ECG 12 Lead    Hypertension-may be causing his symptoms, check labs and start medication today  Left upper arm pain- we will rule out disc disease and get an x-ray         Follow Up   Return in about 2 weeks (around 9/15/2022) for Recheck HTN.  Patient was given instructions and counseling regarding his condition or for health maintenance advice. Please see specific information pulled into the AVS if appropriate.     Transcribed from ambient dictation for Meredith Lea Kehrer, MD by Leola Lam.  09/01/22   16:06 EDT    Patient verbalized consent to the visit recording.

## 2022-09-02 DIAGNOSIS — M79.622 LEFT UPPER ARM PAIN: ICD-10-CM

## 2022-09-02 DIAGNOSIS — I10 ESSENTIAL HYPERTENSION: ICD-10-CM

## 2022-09-02 LAB
ALBUMIN SERPL-MCNC: 4.5 G/DL (ref 3.8–4.9)
ALBUMIN/GLOB SERPL: 2 {RATIO} (ref 1.2–2.2)
ALP SERPL-CCNC: 85 IU/L (ref 44–121)
ALT SERPL-CCNC: 19 IU/L (ref 0–44)
AST SERPL-CCNC: 15 IU/L (ref 0–40)
BASOPHILS # BLD AUTO: 0.1 X10E3/UL (ref 0–0.2)
BASOPHILS NFR BLD AUTO: 1 %
BILIRUB SERPL-MCNC: 1 MG/DL (ref 0–1.2)
BUN SERPL-MCNC: 12 MG/DL (ref 6–24)
BUN/CREAT SERPL: 11 (ref 9–20)
CALCIUM SERPL-MCNC: 9.3 MG/DL (ref 8.7–10.2)
CHLORIDE SERPL-SCNC: 101 MMOL/L (ref 96–106)
CHOLEST SERPL-MCNC: 269 MG/DL (ref 100–199)
CO2 SERPL-SCNC: 23 MMOL/L (ref 20–29)
CREAT SERPL-MCNC: 1.08 MG/DL (ref 0.76–1.27)
EGFRCR-CYS SERPLBLD CKD-EPI 2021: 83 ML/MIN/1.73
EOSINOPHIL # BLD AUTO: 0.1 X10E3/UL (ref 0–0.4)
EOSINOPHIL NFR BLD AUTO: 1 %
ERYTHROCYTE [DISTWIDTH] IN BLOOD BY AUTOMATED COUNT: 12.5 % (ref 11.6–15.4)
GLOBULIN SER CALC-MCNC: 2.3 G/DL (ref 1.5–4.5)
GLUCOSE SERPL-MCNC: 99 MG/DL (ref 65–99)
HCT VFR BLD AUTO: 48.5 % (ref 37.5–51)
HDLC SERPL-MCNC: 49 MG/DL
HGB BLD-MCNC: 15.8 G/DL (ref 13–17.7)
IMM GRANULOCYTES # BLD AUTO: 0 X10E3/UL (ref 0–0.1)
IMM GRANULOCYTES NFR BLD AUTO: 1 %
LDLC SERPL CALC-MCNC: 179 MG/DL (ref 0–99)
LYMPHOCYTES # BLD AUTO: 2 X10E3/UL (ref 0.7–3.1)
LYMPHOCYTES NFR BLD AUTO: 30 %
MCH RBC QN AUTO: 29.4 PG (ref 26.6–33)
MCHC RBC AUTO-ENTMCNC: 32.6 G/DL (ref 31.5–35.7)
MCV RBC AUTO: 90 FL (ref 79–97)
MONOCYTES # BLD AUTO: 0.7 X10E3/UL (ref 0.1–0.9)
MONOCYTES NFR BLD AUTO: 10 %
NEUTROPHILS # BLD AUTO: 3.7 X10E3/UL (ref 1.4–7)
NEUTROPHILS NFR BLD AUTO: 57 %
PLATELET # BLD AUTO: 277 X10E3/UL (ref 150–450)
POTASSIUM SERPL-SCNC: 4.3 MMOL/L (ref 3.5–5.2)
PROT SERPL-MCNC: 6.8 G/DL (ref 6–8.5)
RBC # BLD AUTO: 5.37 X10E6/UL (ref 4.14–5.8)
SODIUM SERPL-SCNC: 141 MMOL/L (ref 134–144)
TRIGL SERPL-MCNC: 219 MG/DL (ref 0–149)
TSH SERPL DL<=0.005 MIU/L-ACNC: 2.38 UIU/ML (ref 0.45–4.5)
VLDLC SERPL CALC-MCNC: 41 MG/DL (ref 5–40)
WBC # BLD AUTO: 6.5 X10E3/UL (ref 3.4–10.8)

## 2022-09-02 PROCEDURE — 93000 ELECTROCARDIOGRAM COMPLETE: CPT | Performed by: FAMILY MEDICINE

## 2022-09-02 RX ORDER — LISINOPRIL AND HYDROCHLOROTHIAZIDE 12.5; 1 MG/1; MG/1
1 TABLET ORAL DAILY
Qty: 30 TABLET | Refills: 1 | OUTPATIENT
Start: 2022-09-02

## 2022-09-06 ENCOUNTER — PATIENT ROUNDING (BHMG ONLY) (OUTPATIENT)
Dept: FAMILY MEDICINE CLINIC | Facility: CLINIC | Age: 52
End: 2022-09-06

## 2022-09-06 NOTE — PROGRESS NOTES
Sent Patient following in [employer] Message:  My name is Brando Beasley      I am the Practice Manager with   [facility] [address]  [facility] [address] [phone].      I am messaging to officially welcome you to our practice and ask about your recent visit.     Tell me about your visit with us. What things went well?         We're always looking for ways to make our patients' experiences even better. Do you have recommendations on ways we may improve?       Overall were you satisfied with your first visit to our practice?        Is there anything else I can do for you?       Thank you, and have a great day.

## 2022-09-26 ENCOUNTER — OFFICE VISIT (OUTPATIENT)
Dept: FAMILY MEDICINE CLINIC | Facility: CLINIC | Age: 52
End: 2022-09-26

## 2022-09-26 VITALS
HEIGHT: 65 IN | WEIGHT: 245.8 LBS | BODY MASS INDEX: 40.95 KG/M2 | HEART RATE: 84 BPM | SYSTOLIC BLOOD PRESSURE: 132 MMHG | DIASTOLIC BLOOD PRESSURE: 80 MMHG | OXYGEN SATURATION: 99 % | TEMPERATURE: 97.1 F

## 2022-09-26 DIAGNOSIS — T88.7XXA MEDICATION SIDE EFFECTS: ICD-10-CM

## 2022-09-26 DIAGNOSIS — J01.00 ACUTE NON-RECURRENT MAXILLARY SINUSITIS: ICD-10-CM

## 2022-09-26 DIAGNOSIS — E78.2 MIXED HYPERLIPIDEMIA: ICD-10-CM

## 2022-09-26 DIAGNOSIS — I10 ESSENTIAL HYPERTENSION: Primary | ICD-10-CM

## 2022-09-26 PROCEDURE — 99214 OFFICE O/P EST MOD 30 MIN: CPT | Performed by: FAMILY MEDICINE

## 2022-09-26 RX ORDER — FEXOFENADINE HCL 180 MG/1
180 TABLET ORAL DAILY
COMMUNITY

## 2022-09-26 RX ORDER — HYDROCHLOROTHIAZIDE 25 MG/1
25 TABLET ORAL DAILY
Qty: 30 TABLET | Refills: 2 | Status: SHIPPED | OUTPATIENT
Start: 2022-09-26 | End: 2023-01-11 | Stop reason: SDUPTHER

## 2022-09-26 RX ORDER — AMOXICILLIN AND CLAVULANATE POTASSIUM 875; 125 MG/1; MG/1
1 TABLET, FILM COATED ORAL 2 TIMES DAILY
Qty: 14 TABLET | Refills: 0 | Status: SHIPPED | OUTPATIENT
Start: 2022-09-26 | End: 2022-10-03

## 2022-09-26 NOTE — PATIENT INSTRUCTIONS
"Hypertension, Adult  High blood pressure (hypertension) is when the force of blood pumping through the arteries is too strong. The arteries are the blood vessels that carry blood from the heart throughout the body. Hypertension forces the heart to work harder to pump blood and may cause arteries to become narrow or stiff. Untreated or uncontrolled hypertension can cause a heart attack, heart failure, a stroke, kidney disease, and other problems.  A blood pressure reading consists of a higher number over a lower number. Ideally, your blood pressure should be below 120/80. The first (\"top\") number is called the systolic pressure. It is a measure of the pressure in your arteries as your heart beats. The second (\"bottom\") number is called the diastolic pressure. It is a measure of the pressure in your arteries as the heart relaxes.  What are the causes?  The exact cause of this condition is not known. There are some conditions that result in or are related to high blood pressure.  What increases the risk?  Some risk factors for high blood pressure are under your control. The following factors may make you more likely to develop this condition:  Smoking.  Having type 2 diabetes mellitus, high cholesterol, or both.  Not getting enough exercise or physical activity.  Being overweight.  Having too much fat, sugar, calories, or salt (sodium) in your diet.  Drinking too much alcohol.  Some risk factors for high blood pressure may be difficult or impossible to change. Some of these factors include:  Having chronic kidney disease.  Having a family history of high blood pressure.  Age. Risk increases with age.  Race. You may be at higher risk if you are .  Gender. Men are at higher risk than women before age 45. After age 65, women are at higher risk than men.  Having obstructive sleep apnea.  Stress.  What are the signs or symptoms?  High blood pressure may not cause symptoms. Very high blood pressure " (hypertensive crisis) may cause:  Headache.  Anxiety.  Shortness of breath.  Nosebleed.  Nausea and vomiting.  Vision changes.  Severe chest pain.  Seizures.  How is this diagnosed?  This condition is diagnosed by measuring your blood pressure while you are seated, with your arm resting on a flat surface, your legs uncrossed, and your feet flat on the floor. The cuff of the blood pressure monitor will be placed directly against the skin of your upper arm at the level of your heart. It should be measured at least twice using the same arm. Certain conditions can cause a difference in blood pressure between your right and left arms.  Certain factors can cause blood pressure readings to be lower or higher than normal for a short period of time:  When your blood pressure is higher when you are in a health care provider's office than when you are at home, this is called white coat hypertension. Most people with this condition do not need medicines.  When your blood pressure is higher at home than when you are in a health care provider's office, this is called masked hypertension. Most people with this condition may need medicines to control blood pressure.  If you have a high blood pressure reading during one visit or you have normal blood pressure with other risk factors, you may be asked to:  Return on a different day to have your blood pressure checked again.  Monitor your blood pressure at home for 1 week or longer.  If you are diagnosed with hypertension, you may have other blood or imaging tests to help your health care provider understand your overall risk for other conditions.  How is this treated?  This condition is treated by making healthy lifestyle changes, such as eating healthy foods, exercising more, and reducing your alcohol intake. Your health care provider may prescribe medicine if lifestyle changes are not enough to get your blood pressure under control, and if:  Your systolic blood pressure is above  130.  Your diastolic blood pressure is above 80.  Your personal target blood pressure may vary depending on your medical conditions, your age, and other factors.  Follow these instructions at home:  Eating and drinking    Eat a diet that is high in fiber and potassium, and low in sodium, added sugar, and fat. An example eating plan is called the DASH (Dietary Approaches to Stop Hypertension) diet. To eat this way:  Eat plenty of fresh fruits and vegetables. Try to fill one half of your plate at each meal with fruits and vegetables.  Eat whole grains, such as whole-wheat pasta, brown rice, or whole-grain bread. Fill about one fourth of your plate with whole grains.  Eat or drink low-fat dairy products, such as skim milk or low-fat yogurt.  Avoid fatty cuts of meat, processed or cured meats, and poultry with skin. Fill about one fourth of your plate with lean proteins, such as fish, chicken without skin, beans, eggs, or tofu.  Avoid pre-made and processed foods. These tend to be higher in sodium, added sugar, and fat.  Reduce your daily sodium intake. Most people with hypertension should eat less than 1,500 mg of sodium a day.  Do not drink alcohol if:  Your health care provider tells you not to drink.  You are pregnant, may be pregnant, or are planning to become pregnant.  If you drink alcohol:  Limit how much you use to:  0-1 drink a day for women.  0-2 drinks a day for men.  Be aware of how much alcohol is in your drink. In the [place] one drink equals one 12 oz bottle of beer (355 mL), one 5 oz glass of wine (148 mL), or one 1½ oz glass of hard liquor (44 mL).  Lifestyle    Work with your health care provider to maintain a healthy body weight or to lose weight. Ask what an ideal weight is for you.  Get at least 30 minutes of exercise most days of the week. Activities may include walking, swimming, or biking.  Include exercise to strengthen your muscles (resistance exercise), such as Pilates or lifting weights, as  part of your weekly exercise routine. Try to do these types of exercises for 30 minutes at least 3 days a week.  Do not use any products that contain nicotine or tobacco, such as cigarettes, e-cigarettes, and chewing tobacco. If you need help quitting, ask your health care provider.  Monitor your blood pressure at home as told by your health care provider.  Keep all follow-up visits as told by your health care provider. This is important.  Medicines  Take over-the-counter and prescription medicines only as told by your health care provider. Follow directions carefully. Blood pressure medicines must be taken as prescribed.  Do not skip doses of blood pressure medicine. Doing this puts you at risk for problems and can make the medicine less effective.  Ask your health care provider about side effects or reactions to medicines that you should watch for.  Contact a health care provider if you:  Think you are having a reaction to a medicine you are taking.  Have headaches that keep coming back (recurring).  Feel dizzy.  Have swelling in your ankles.  Have trouble with your vision.  Get help right away if you:  Develop a severe headache or confusion.  Have unusual weakness or numbness.  Feel faint.  Have severe pain in your chest or abdomen.  Vomit repeatedly.  Have trouble breathing.  Summary  Hypertension is when the force of blood pumping through your arteries is too strong. If this condition is not controlled, it may put you at risk for serious complications.  Your personal target blood pressure may vary depending on your medical conditions, your age, and other factors. For most people, a normal blood pressure is less than 120/80.  Hypertension is treated with lifestyle changes, medicines, or a combination of both. Lifestyle changes include losing weight, eating a healthy, low-sodium diet, exercising more, and limiting alcohol.  This information is not intended to replace advice given to you by your health care  provider. Make sure you discuss any questions you have with your health care provider.  Document Revised: 08/28/2019 Document Reviewed: 08/28/2019  Elsevier Patient Education © 2022 [employer] Inc.  Mediterranean Diet  A Mediterranean diet refers to food and lifestyle choices that are based on the traditions of countries located on the [place] Sea. It focuses on eating more fruits, vegetables, whole grains, beans, nuts, seeds, and heart-healthy fats, and eating less dairy, meat, eggs, and processed foods with added sugar, salt, and fat. This way of eating has been shown to help prevent certain conditions and improve outcomes for people who have chronic diseases, like kidney disease and heart disease.  What are tips for following this plan?  Reading food labels  Check the serving size of packaged foods. For foods such as rice and pasta, the serving size refers to the amount of cooked product, not dry.  Check the total fat in packaged foods. Avoid foods that have saturated fat or trans fats.  Check the ingredient list for added sugars, such as corn syrup.  Shopping    Buy a variety of foods that offer a balanced diet, including:  Fresh fruits and vegetables (produce).  Grains, beans, nuts, and seeds. Some of these may be available in unpackaged forms or large amounts (in bulk).  Fresh seafood.  Poultry and eggs.  Low-fat dairy products.  Buy whole ingredients instead of prepackaged foods.  Buy fresh fruits and vegetables in-season from local farmers markets.  Buy plain frozen fruits and vegetables.  If you do not have access to quality fresh seafood, buy precooked frozen shrimp or canned fish, such as tuna, salmon, or sardines.  Stock your pantry so you always have certain foods on hand, such as olive oil, canned tuna, canned tomatoes, rice, pasta, and beans.  Cooking  Cook foods with extra-virgin olive oil instead of using butter or other vegetable oils.  Have meat as a side dish, and have vegetables or grains as  your main dish. This means having meat in small portions or adding small amounts of meat to foods like pasta or stew.  Use beans or vegetables instead of meat in common dishes like chili or lasagna.  [place] with different cooking methods. Try roasting, broiling, steaming, and sautéing vegetables.  Add frozen vegetables to soups, stews, pasta, or rice.  Add nuts or seeds for added healthy fats and plant protein at each meal. You can add these to yogurt, salads, or vegetable dishes.  Marinate fish or vegetables using olive oil, lemon juice, garlic, and fresh herbs.  Meal planning  Plan to eat one vegetarian meal one day each week. Try to work up to two vegetarian meals, if possible.  Eat seafood two or more times a week.  Have healthy snacks readily available, such as:  Vegetable sticks with hummus.  Greek yogurt.  Fruit and nut trail mix.  Eat balanced meals throughout the week. This includes:  Fruit: 2-3 servings a day.  Vegetables: 4-5 servings a day.  Low-fat dairy: 2 servings a day.  Fish, poultry, or lean meat: 1 serving a day.  Beans and legumes: 2 or more servings a week.  Nuts and seeds: 1-2 servings a day.  Whole grains: 6-8 servings a day.  Extra-virgin olive oil: 3-4 servings a day.  Limit red meat and sweets to only a few servings a month.  Lifestyle    Cook and eat meals together with your family, when possible.  Drink enough fluid to keep your urine pale yellow.  Be physically active every day. This includes:  Aerobic exercise like running or swimming.  Leisure activities like gardening, walking, or housework.  Get 7-8 hours of sleep each night.  If recommended by your health care provider, drink red wine in moderation. This means 1 glass a day for nonpregnant women and 2 glasses a day for men. A glass of wine equals 5 oz (150 mL).  What foods should I eat?  Fruits  Apples. Apricots. Avocado. Berries. Bananas. Cherries. Dates. Figs. Grapes. Vicki. Melon. Oranges. Peaches. Plums.  Pomegranate.  Vegetables  Artichokes. Beets. Broccoli. Cabbage. Carrots. Eggplant. Green beans. Chard. Kale. Spinach. Onions. Leeks. Peas. Squash. Tomatoes. Peppers. Radishes.  Grains  Whole-grain pasta. Brown rice. Bulgur wheat. Polenta. Couscous. Whole-wheat bread. Oatmeal. Quinoa.  Meats and other proteins  Beans. Almonds. Sunflower seeds. Pine nuts. Peanuts. Cod. [place]. Scallops. Shrimp. Tuna. Tilapia. Clams. Oysters. Eggs. Poultry without skin.  Dairy  Low-fat milk. Cheese. Greek yogurt.  Fats and oils  Extra-virgin olive oil. Avocado oil. Grapeseed oil.  Beverages  Water. Red wine. Herbal tea.  Sweets and desserts  Greek yogurt with honey. Baked apples. Poached pears. Trail mix.  Seasonings and condiments  Basil. Cilantro. Coriander. Cumin. Mint. Parsley. Magan. Rosemary. Tarragon. Garlic. Oregano. Thyme. Pepper. Balsamic vinegar. Tahini. Hummus. Tomato sauce. Olives. Mushrooms.  The items listed above may not be a complete list of foods and beverages you can eat. Contact a dietitian for more information.  What foods should I limit?  This is a list of foods that should be eaten rarely or only on special occasions.  Fruits  Fruit canned in syrup.  Vegetables  Deep-fried potatoes (french fries).  Grains  Prepackaged pasta or rice dishes. Prepackaged cereal with added sugar. Prepackaged snacks with added sugar.  Meats and other proteins  Beef. Pork. Lamb. Poultry with skin. Hot dogs. [place].  Dairy  Ice cream. Sour cream. Whole milk.  Fats and oils  Butter. Canola oil. Vegetable oil. Beef fat (tallow). Lard.  Beverages  Juice. Sugar-sweetened soft drinks. Beer. Liquor and spirits.  Sweets and desserts  Cookies. Cakes. Pies. Candy.  Seasonings and condiments  Mayonnaise. Pre-made sauces and marinades.  The items listed above may not be a complete list of foods and beverages you should limit. Contact a dietitian for more information.  Summary  The Mediterranean diet includes both food and lifestyle choices.  Eat a  variety of fresh fruits and vegetables, beans, nuts, seeds, and whole grains.  Limit the amount of red meat and sweets that you eat.  If recommended by your health care provider, drink red wine in moderation. This means 1 glass a day for nonpregnant women and 2 glasses a day for men. A glass of wine equals 5 oz (150 mL).  This information is not intended to replace advice given to you by your health care provider. Make sure you discuss any questions you have with your health care provider.  Document Revised: 01/22/2021 Document Reviewed: 11/19/2020  Elsevier Patient Education © 2022 [employer] Inc.

## 2022-09-26 NOTE — PROGRESS NOTES
"Chief Complaint  Hypertension    Subjective        Bob Guerrero presents to [facility]  History of Present Illness    The patient presents today for follow-up on blood pressure. He states he has been experiencing a bad taste in his mouth, as well as lightheadedness and fatigue. The patient has lost 5 pounds since his last visit and has been monitoring his sodium intake. The patient states he has been experiencing bilateral ear pain, as well as pain in the upper rear part of his mouth and upper teeth. He recently had a tooth extracted from the right side of his mouth. He believes that it could be a possible sinus infection.     Objective   Vital Signs:  /80   Pulse 84   Temp 97.1 °F (36.2 °C)   Ht 165.1 cm (65\")   Wt 111 kg (245 lb 12.8 oz)   SpO2 99%   BMI 40.90 kg/m²   Estimated body mass index is 40.9 kg/m² as calculated from the following:    Height as of this encounter: 165.1 cm (65\").    Weight as of this encounter: 111 kg (245 lb 12.8 oz).      Lipid Panel (09/01/2022 15:08)  Comprehensive Metabolic Panel (09/01/2022 15:08)  CBC & Differential (09/01/2022 15:08)  TSH (09/01/2022 15:08)      Physical Exam  Constitutional:       General: He is not in acute distress.     Appearance: He is well-developed. He is obese.   HENT:      Head: Normocephalic and atraumatic.      Right Ear: Tympanic membrane, ear canal and external ear normal.      Left Ear: Tympanic membrane, ear canal and external ear normal.      Nose: Nose normal.      Mouth/Throat:      Mouth: Mucous membranes are moist.      Pharynx: Oropharynx is clear.   Eyes:      Conjunctiva/sclera: Conjunctivae normal.      Pupils: Pupils are equal, round, and reactive to light.   Cardiovascular:      Rate and Rhythm: Normal rate and regular rhythm.      Heart sounds: No murmur heard.  Pulmonary:      Effort: Pulmonary effort is normal. No respiratory distress.      Breath sounds: Normal breath sounds. "   Musculoskeletal:      Cervical back: Neck supple.      Right lower leg: No edema.      Left lower leg: No edema.   Lymphadenopathy:      Cervical: No cervical adenopathy.   Neurological:      Mental Status: He is alert and oriented to person, place, and time.   Psychiatric:         Mood and Affect: Mood normal.         Behavior: Behavior normal.        Result Review :                Assessment and Plan   Diagnoses and all orders for this visit:    1. Essential hypertension (Primary)  -     hydroCHLOROthiazide (HYDRODIURIL) 25 MG tablet; Take 1 tablet by mouth Daily.  Dispense: 30 tablet; Refill: 2    2. Mixed hyperlipidemia    3. Acute non-recurrent maxillary sinusitis  -     amoxicillin-clavulanate (Augmentin) 875-125 MG per tablet; Take 1 tablet by mouth 2 (Two) Times a Day for 7 days.  Dispense: 14 tablet; Refill: 0    4. Medication side effects    1. Hypertension  Patient will take hydrochlorothiazide and continue to monitor diet.  We will stop the lisinopril to see if that was causing any changes in taste.  Monitor blood pressure and keep it to less than 140/90    2. Sinus infection  Patient will take Augmentin twice a day for 7 days.    #3-hyperlipidemia-patient will work hard on diet and we will recheck we will follow-up his blood pressure in 2 months    4.-Possible medication side effects       Follow Up   Return in about 2 months (around 11/26/2022).  Patient was given instructions and counseling regarding his condition or for health maintenance advice. Please see specific information pulled into the AVS if appropriate.       Answers for HPI/ROS submitted by the patient on 9/19/2022  What is the primary reason for your visit?: Other  Please describe your symptoms.: Follow up from initial visit  Have you had these symptoms before?: Yes  How long have you been having these symptoms?: 1-2 weeks      Transcribed from ambient dictation for Meredith Lea Kehrer, MD by Pia Watkins.  09/26/22   19:41  EDT    Patient verbalized consent to the visit recording.  I have personally performed the services described in this document as transcribed by the above individual, and it is both accurate and complete.  Meredith Lea Kehrer, MD  9/27/2022  07:47 EDT

## 2022-11-04 ENCOUNTER — TELEPHONE (OUTPATIENT)
Dept: GASTROENTEROLOGY | Facility: CLINIC | Age: 52
End: 2022-11-04

## 2022-12-28 ENCOUNTER — PREP FOR SURGERY (OUTPATIENT)
Dept: SURGERY | Facility: SURGERY CENTER | Age: 52
End: 2022-12-28

## 2022-12-28 DIAGNOSIS — R06.09 DYSPNEA ON EXERTION: Primary | ICD-10-CM

## 2022-12-28 DIAGNOSIS — Z12.11 ENCOUNTER FOR SCREENING FOR MALIGNANT NEOPLASM OF COLON: ICD-10-CM

## 2022-12-28 RX ORDER — SODIUM CHLORIDE, SODIUM LACTATE, POTASSIUM CHLORIDE, CALCIUM CHLORIDE 600; 310; 30; 20 MG/100ML; MG/100ML; MG/100ML; MG/100ML
30 INJECTION, SOLUTION INTRAVENOUS CONTINUOUS PRN
Status: CANCELLED | OUTPATIENT
Start: 2022-12-28

## 2022-12-28 RX ORDER — SODIUM CHLORIDE 0.9 % (FLUSH) 0.9 %
3 SYRINGE (ML) INJECTION EVERY 12 HOURS SCHEDULED
Status: CANCELLED | OUTPATIENT
Start: 2022-12-28

## 2022-12-28 RX ORDER — SODIUM CHLORIDE 0.9 % (FLUSH) 0.9 %
10 SYRINGE (ML) INJECTION AS NEEDED
Status: CANCELLED | OUTPATIENT
Start: 2022-12-28

## 2022-12-30 PROBLEM — Z12.11 ENCOUNTER FOR SCREENING FOR MALIGNANT NEOPLASM OF COLON: Status: ACTIVE | Noted: 2022-12-30

## 2022-12-30 NOTE — PROGRESS NOTES
RM:________     PCP: Kehrer, Meredith Lea, MD    : 1970  AGE: 52 y.o.  EST PATIENT   REASON FOR VISIT/  CC:    BP Readings from Last 3 Encounters:   22 132/80   22 158/100   22 149/83        WT: ____________ BP: __________L __________R HR______    CHEST PAIN: _____________    SOA: _____________PALPS: _______________     LIGHTHEADED: ___________FATIGUE: ________________ EDEMA __________    ALLERGIES:Cefdinir SMOKING HISTORY:  Social History     Tobacco Use   • Smoking status: Never   • Smokeless tobacco: Never   • Tobacco comments:     caffeine use: 4-5 cups daily and soft drinks throughout the day.    Vaping Use   • Vaping Use: Never used   Substance Use Topics   • Alcohol use: Yes     Alcohol/week: 6.0 standard drinks     Types: 3 Cans of beer, 3 Shots of liquor per week     Comment: occ   • Drug use: No     CAFFEINE USE_________________  ALCOHOL ______________________    Below is the patient's most recent value for Albumin, ALT, AST, BUN, Calcium, Chloride, Cholesterol, CO2, Creatinine, GFR, Glucose, HDL, Hematocrit, Hemoglobin, Hemoglobin A1C, LDL, Magnesium, Phosphorus, Platelets, Potassium, PSA, Sodium, Triglycerides, TSH and WBC.   Lab Results   Component Value Date    ALBUMIN 4.5 2022    ALT 19 2022    AST 15 2022    BUN 12 2022    CALCIUM 9.3 2022     2022    CHOL 247 (H) 2017    CO2 23 2022    CREATININE 1.08 2022    GLU 78 2019    HDL 49 2022    HCT 48.5 2022    HGB 15.8 2022     (H) 2022    PHOS 3.3 2017     2022    K 4.3 2022    PSA 0.635 2017     2022    TRIG 219 (H) 2022    TSH 2.380 2022    WBC 6.5 2022          NEW DIAGNOSIS/ SURGERY/ HOSP OR ED VISITS: ______________________    __________________________________________________________________      RECENT LABS OR DIAGNOSTIC TESTING:   _____________________________    __________________________________________________________________      ASSESSMENT/ PLAN: _______________________________________________    __________________________________________________________________

## 2022-12-30 NOTE — PROGRESS NOTES
RM:________    Referral Provider: No ref. provider found Kehrer, Meredith Lea, MD    NEW PATIENT/ CONSULT  PREVIOUS CARDIOLOGIST:   CARDIAC TESTING:     : 1970   AGE: 52 y.o.    2023  REASON FOR VISIT/  CC:      WT: ____________ BP: __________L __________R/ HR_______________    CHEST PAIN: _____________    SOA: ____________PALPS: __________      LIGHTHEADED: ___________ FATIGUE: _______________ EDEMA______________  ALLERGIES:  Cefdinir  SMOKING HISTORY  Social History     Tobacco Use   • Smoking status: Never   • Smokeless tobacco: Never   • Tobacco comments:     caffeine use: 4-5 cups daily and soft drinks throughout the day.    Vaping Use   • Vaping Use: Never used   Substance Use Topics   • Alcohol use: Yes     Alcohol/week: 6.0 standard drinks     Types: 3 Cans of beer, 3 Shots of liquor per week     Comment: occ   • Drug use: No          CHILDREN: _______________________       CAFFEINE USE________  ALCOHOL _____________  OCCUPATION_____________  Past Surgical History:   Procedure Laterality Date   • ABLATION OF DYSRHYTHMIC FOCUS      dx'd in 20s   • ADENOIDECTOMY     • CARDIAC SURGERY   and     Catheter Ablation to treat Supraventricular Tachycardia   • TONSILLECTOMY                  FAMILY HISTORY  HEART DISEASE  CHF  DIABETES  CARDIAC ARREST  STROKE  CANCER  ANEURYSM                                                             H/O: MI_____   STROKE________   GOUT_____   ANEMIA______     CAROTID________ HIV____ CAD_______ HYPERCHOL _____    H/O: CHF _____   RF____ DM___ HTN_______PVD____THYROID DISEASE_______    PMH: GI ____   HEPATITIS ___ KIDNEY DISEASE ___ LUNG DISEASE _______     SLEEP APNEA ____ BLOOD CLOTS ____ DVT ____ VEIN STRIPPING ___________     CANCER _________________________________ CHEMO/ RADIATION__________

## 2023-01-11 ENCOUNTER — OFFICE VISIT (OUTPATIENT)
Dept: FAMILY MEDICINE CLINIC | Facility: CLINIC | Age: 53
End: 2023-01-11
Payer: COMMERCIAL

## 2023-01-11 VITALS
SYSTOLIC BLOOD PRESSURE: 140 MMHG | HEART RATE: 76 BPM | WEIGHT: 236 LBS | OXYGEN SATURATION: 99 % | TEMPERATURE: 97.9 F | DIASTOLIC BLOOD PRESSURE: 86 MMHG | HEIGHT: 65 IN | BODY MASS INDEX: 39.32 KG/M2

## 2023-01-11 DIAGNOSIS — E78.2 MIXED HYPERLIPIDEMIA: ICD-10-CM

## 2023-01-11 DIAGNOSIS — I10 ESSENTIAL HYPERTENSION: Primary | ICD-10-CM

## 2023-01-11 PROCEDURE — 99213 OFFICE O/P EST LOW 20 MIN: CPT | Performed by: FAMILY MEDICINE

## 2023-01-11 RX ORDER — HYDROCHLOROTHIAZIDE 25 MG/1
25 TABLET ORAL DAILY
Qty: 90 TABLET | Refills: 1 | Status: SHIPPED | OUTPATIENT
Start: 2023-01-11

## 2023-01-11 NOTE — PROGRESS NOTES
"Answers for HPI/ROS submitted by the patient on 1/4/2023  What is the primary reason for your visit?: Other  Please describe your symptoms.: Follow up from last appointment to have bloodwork done etc  Have you had these symptoms before?: Yes  How long have you been having these symptoms?: Greater than 2 weeks    Chief Complaint  Med Refill and Hypertension    Subjective        Bob Guerrero presents to [facility]  History of Present Illness  Bob Guerrero is a 52-year-old male who presents today for follow-up on his blood pressure and dyslipidemia.    Hypertension  The patient states he has a blood pressure monitor at home and normally checks his blood pressure in the evening. He reports that his blood pressure has been around 120/76 mmHg. He did not sleep well last night as he has had stress related to his job.    Hyperlipidemia  He has been following the Mediterranean diet. He would like to have his cholesterol levels checked to see if his diet has had any effect. He states he has been eating a lot of beans and trying to incorporate the low sodium into his diet.     GERD  He reports that since starting the Mediterranean diet, he has not had to use Prilosec.     Post COVID-19 infection  The patient states he had COVID-19 in 12/2022. He states he has had all of his COVID-19 vaccines except for the bivalent booster.     Review of Systems  A review of systems was performed, and pertinent negatives are noted in the HPI.    Objective   Vital Signs:  /86   Pulse 76   Temp 97.9 °F (36.6 °C)   Ht 165.1 cm (65\")   Wt 107 kg (236 lb)   SpO2 99%   BMI 39.27 kg/m²   Estimated body mass index is 39.27 kg/m² as calculated from the following:    Height as of this encounter: 165.1 cm (65\").    Weight as of this encounter: 107 kg (236 lb).             Physical Exam  Constitutional:       General: He is not in acute distress.     Appearance: He is well-developed.   HENT:      Head: " Normocephalic and atraumatic.      Right Ear: Tympanic membrane, ear canal and external ear normal.      Left Ear: Tympanic membrane, ear canal and external ear normal.      Nose: Nose normal.      Mouth/Throat:      Mouth: Mucous membranes are moist.      Pharynx: Oropharynx is clear.   Eyes:      Conjunctiva/sclera: Conjunctivae normal.      Pupils: Pupils are equal, round, and reactive to light.   Cardiovascular:      Rate and Rhythm: Normal rate and regular rhythm.      Heart sounds: No murmur heard.  Pulmonary:      Effort: Pulmonary effort is normal. No respiratory distress.      Breath sounds: Normal breath sounds.   Musculoskeletal:      Cervical back: Neck supple.      Right lower leg: No edema.      Left lower leg: No edema.   Lymphadenopathy:      Cervical: No cervical adenopathy.   Neurological:      Mental Status: He is alert and oriented to person, place, and time.   Psychiatric:         Mood and Affect: Mood normal.         Behavior: Behavior normal.        Result Review :                   Assessment and Plan   Diagnoses and all orders for this visit:    1. Essential hypertension (Primary)  -     Lipid Panel  -     Comprehensive Metabolic Panel  -     hydroCHLOROthiazide (HYDRODIURIL) 25 MG tablet; Take 1 tablet by mouth Daily.  Dispense: 90 tablet; Refill: 1    2. Mixed hyperlipidemia  -     Lipid Panel  -     Comprehensive Metabolic Panel      -Patient's blood pressure is controlled at 136/84 mmHg.   -No adjustment in medications will be made.   -Repeat laboratory studies today.       The patient will follow-up in 6 months for annual exam.         Follow Up   Return in about 6 months (around 7/11/2023) for Annual physical.  Patient was given instructions and counseling regarding his condition or for health maintenance advice. Please see specific information pulled into the AVS if appropriate.     Transcribed from ambient dictation for Meredith Lea Kehrer, MD by Bethany Fierro, Quality Documentation  Specialist.  01/11/23   15:22 EST    Patient or patient representative verbalized consent to the visit recording.

## 2023-01-12 LAB
ALBUMIN SERPL-MCNC: 4.4 G/DL (ref 3.8–4.9)
ALBUMIN/GLOB SERPL: 1.5 {RATIO} (ref 1.2–2.2)
ALP SERPL-CCNC: 71 IU/L (ref 44–121)
ALT SERPL-CCNC: 19 IU/L (ref 0–44)
AST SERPL-CCNC: 16 IU/L (ref 0–40)
BILIRUB SERPL-MCNC: 1.3 MG/DL (ref 0–1.2)
BUN SERPL-MCNC: 14 MG/DL (ref 6–24)
BUN/CREAT SERPL: 13 (ref 9–20)
CALCIUM SERPL-MCNC: 9.4 MG/DL (ref 8.7–10.2)
CHLORIDE SERPL-SCNC: 99 MMOL/L (ref 96–106)
CHOLEST SERPL-MCNC: 271 MG/DL (ref 100–199)
CO2 SERPL-SCNC: 25 MMOL/L (ref 20–29)
CREAT SERPL-MCNC: 1.05 MG/DL (ref 0.76–1.27)
EGFRCR SERPLBLD CKD-EPI 2021: 86 ML/MIN/1.73
GLOBULIN SER CALC-MCNC: 2.9 G/DL (ref 1.5–4.5)
GLUCOSE SERPL-MCNC: 95 MG/DL (ref 70–99)
HDLC SERPL-MCNC: 48 MG/DL
LDLC SERPL CALC-MCNC: 186 MG/DL (ref 0–99)
POTASSIUM SERPL-SCNC: 3.5 MMOL/L (ref 3.5–5.2)
PROT SERPL-MCNC: 7.3 G/DL (ref 6–8.5)
SODIUM SERPL-SCNC: 139 MMOL/L (ref 134–144)
TRIGL SERPL-MCNC: 196 MG/DL (ref 0–149)
VLDLC SERPL CALC-MCNC: 37 MG/DL (ref 5–40)

## 2023-01-13 ENCOUNTER — TELEPHONE (OUTPATIENT)
Dept: FAMILY MEDICINE CLINIC | Facility: CLINIC | Age: 53
End: 2023-01-13

## 2023-01-13 NOTE — TELEPHONE ENCOUNTER
Caller: Bob Guerrero    Relationship to patient: Self    Best call back number: [phone]     Patient is needing: PATIENT IS CALLING BACK TO SPEAK WITH JOANN.  HE STATES HE THAT HE HAS SOME QUESTIONS.  HE STATES THAT WHEN HE GOT THE LABS DONE  HE WAS FASTING.  HE SAID THE TECH CHECKED THAT HE WAS NOT FASTING, BUT SHE DID NOT ASK HIM.  HE IS REQUESTING A RETURN CALL.    UNABLE TO WARM TRANSFER.    PLEASE ADVISE.

## 2023-01-23 ENCOUNTER — OFFICE VISIT (OUTPATIENT)
Dept: CARDIOLOGY | Facility: CLINIC | Age: 53
End: 2023-01-23
Payer: COMMERCIAL

## 2023-01-23 VITALS
DIASTOLIC BLOOD PRESSURE: 82 MMHG | BODY MASS INDEX: 39.42 KG/M2 | WEIGHT: 236.6 LBS | HEIGHT: 65 IN | SYSTOLIC BLOOD PRESSURE: 120 MMHG | HEART RATE: 78 BPM

## 2023-01-23 DIAGNOSIS — I10 PRIMARY HYPERTENSION: ICD-10-CM

## 2023-01-23 DIAGNOSIS — E78.2 MIXED HYPERLIPIDEMIA: ICD-10-CM

## 2023-01-23 DIAGNOSIS — Z86.79 HISTORY OF PSVT (PAROXYSMAL SUPRAVENTRICULAR TACHYCARDIA): ICD-10-CM

## 2023-01-23 DIAGNOSIS — M79.602 LEFT ARM PAIN: Primary | ICD-10-CM

## 2023-01-23 PROCEDURE — 93000 ELECTROCARDIOGRAM COMPLETE: CPT | Performed by: INTERNAL MEDICINE

## 2023-01-23 PROCEDURE — 99204 OFFICE O/P NEW MOD 45 MIN: CPT | Performed by: INTERNAL MEDICINE

## 2023-01-23 NOTE — PROGRESS NOTES
Date of Office Visit: 2023  Encounter Provider: Bart Ellis MD  Place of Service: [facility] CARDIOLOGY  Patient Name: Bob Guerrero  :1970    Chief Complaint   Patient presents with   • Establish Care   • Arm Pain   :     HPI: Bob Guerrero is a 52 y.o. male who presents today for re-evaluation. I evaluated him in 2017 for atypical chest pain; he exercised for 9.5 minutes of a Wagner protocol without angina or EKG changes.     He had SVT as a child, and underwent ablation in  followed by a repeat ablation in  (he was told that he had four separate foci, and that only two were addressed the first time).  He has not had any SVT since then; he has rare isolated palpitations.    He denies any family history of heart disease.  He has hyperlipidemia but has declined statin therapy. He was recently started on HCTZ for hypertension; he was previously intolerance of lisinopril due to fatigue. He feels that it also makes him fatigued and may be worsening his dry eyes. He has sleep apnea but is unable to be treated with PAP due to insurance issues.     He has noted pain in his left ear and neck and arm. They are intermittent and occur at rest. It lasts most of the day when it happens. It felt better when he was on oral prednisone (for COVID).  He does not notice it with exertion.     Past Medical History:   Diagnosis Date   • Acid reflux    • Allergic     Seasonal allergies   • Hyperlipidemia    • Morbid obesity with BMI of 40.0-44.9, adult ([facility])    • Sleep apnea    • SVT (supraventricular tachycardia) ([facility])     s/p ablation in  and    • Visual impairment     Dry eyes. I use Restasis eye drops twice a day       Past Surgical History:   Procedure Laterality Date   • ABLATION OF DYSRHYTHMIC FOCUS      dx'd in 20s   • ADENOIDECTOMY     • CARDIAC SURGERY   and     Catheter Ablation to treat Supraventricular Tachycardia   • TONSILLECTOMY    "      Social History     Socioeconomic History   • Marital status:    • Number of children: 1   Tobacco Use   • Smoking status: Never   • Smokeless tobacco: Never   • Tobacco comments:     caffeine use: 4-5 cups daily and soft drinks throughout the day.    Vaping Use   • Vaping Use: Never used   Substance and Sexual Activity   • Alcohol use: Yes     Alcohol/week: 6.0 standard drinks     Types: 3 Cans of beer, 3 Shots of liquor per week     Comment: occ   • Drug use: No   • Sexual activity: Yes     Partners: Female       Family History   Problem Relation Age of Onset   • Diabetes Mother    • Hypertension Mother    • Kidney disease Mother    • Liver disease Mother         Fatty Liver. My mom had Acsites   • No Known Problems Father    • Arthritis Maternal Grandmother    • Hypertension Maternal Grandmother    • Diabetes Maternal Grandmother    • Diabetes Maternal Grandfather        Review of Systems   Constitutional: Positive for malaise/fatigue.   Cardiovascular: Positive for chest pain and dyspnea on exertion. Negative for palpitations.   Endocrine: Positive for polyuria.   Skin: Positive for rash.   Musculoskeletal: Positive for myalgias.   Genitourinary: Positive for frequency.   Neurological: Positive for excessive daytime sleepiness.   All other systems reviewed and are negative.      Allergies   Allergen Reactions   • Cefdinir Other (See Comments)     \"Makes him not feel right\"         Current Outpatient Medications:   •  cholecalciferol (VITAMIN D3) 25 MCG (1000 UT) tablet, Take 1,000 Units by mouth Daily., Disp: , Rfl:   •  fexofenadine (ALLEGRA) 180 MG tablet, Take 180 mg by mouth Daily., Disp: , Rfl:   •  fluticasone (FLONASE) 50 MCG/ACT nasal spray, 2 sprays into the nostril(s) as directed by provider Daily., Disp: , Rfl:   •  hydroCHLOROthiazide (HYDRODIURIL) 25 MG tablet, Take 1 tablet by mouth Daily., Disp: 90 tablet, Rfl: 1  •  omeprazole (priLOSEC) 20 MG capsule, Take 20 mg by mouth As Needed., " "Disp: , Rfl:   •  Restasis 0.05 % ophthalmic emulsion, , Disp: , Rfl:       Objective:     Vitals:    01/23/23 1305   BP: 120/82   BP Location: Left arm   Pulse: 78   Weight: 107 kg (236 lb 9.6 oz)   Height: 165.1 cm (65\")     Body mass index is 39.37 kg/m².    Physical Exam  Vitals reviewed.   Constitutional:       Comments: Obese   HENT:      Head: Normocephalic.      Nose: Nose normal.   Eyes:      Conjunctiva/sclera: Conjunctivae normal.      Pupils: Pupils are equal, round, and reactive to light.   Neck:      Comments: Cannot assess for JVD due to habitus  Cardiovascular:      Rate and Rhythm: Normal rate and regular rhythm.      Pulses: Intact distal pulses.      Heart sounds: Normal heart sounds. No murmur heard.  Pulmonary:      Effort: Pulmonary effort is normal.   Abdominal:      Palpations: Abdomen is soft.      Comments: Obesity limits abdominal exam   Musculoskeletal:         General: Normal range of motion.      Cervical back: Normal range of motion.   Skin:     General: Skin is warm and dry.      Findings: No rash.   Neurological:      Mental Status: He is alert and oriented to person, place, and time.      Cranial Nerves: No cranial nerve deficit.   Psychiatric:         Behavior: Behavior normal.         Thought Content: Thought content normal.         Judgment: Judgment normal.           ECG 12 Lead    Date/Time: 1/23/2023 1:24 PM  Performed by: Bart Ellis MD  Authorized by: Bart Ellis MD   Comparison: compared with previous ECG   Similar to previous ECG  Rhythm: sinus rhythm  Conduction: conduction normal  ST Segments: ST segments normal  T Waves: T waves normal  QRS axis: normal  Other: no other findings    Clinical impression: normal ECG            Assessment:       Diagnosis Plan   1. Left arm pain        2. Mixed hyperlipidemia  ECG 12 Lead      3. Primary hypertension  ECG 12 Lead      4. History of PSVT (paroxysmal supraventricular tachycardia)           Plan:       1. The pain that " he is describing involves his ear, neck, and shoulder and bicep. It's not exertional (in fact, he notices it's better with exercise) and it improved with oral steroids. This sounds musculoskeletal. This does not require further ischemic evaluation.    2/3. His risk factors for cardiovascular disease are hypertension and hyperlipidemia. I agree with the addition of HCTZ; he was previously intolerant of ACE inhibitors. His BP is much better controlled. His lipids are problematic -- his last fasting lipid panel showed an HDL of 48, , , .  Using the ACC ASCVD risk calculator, his 10-year risk is intermediate  (6.3%), which could be brought down to the low risk range/<5% (to 4.7% with a statin, and 3.4% with a statin and better blood pressure control).  He is not interested in taking a statin.  For that reason, I do not recommend CT coronary artery calcium scoring, as it is only indicated for intermediate risk patients in whom the results will change our treatment plan.    4. He has a remote history of SVT ablation x 2 and it appears that these were curative procedures.    He can follow-up with us on an as-needed basis.    Sincerely,       Bart Ellis MD

## 2023-03-29 NOTE — SIGNIFICANT NOTE
Education provided the Patient on the following:    - Nothing to Eat or Drink after MN the night before the procedure    - Avoid red/purple fluids while completing their bowel prep as ordered by physician  -Contact Gastrointerologist office for any questions about specific details regarding colon prep    -You will need to have someone drive you home after your colonoscopy and remain with you for 24 hours after the procedure  - The date of your Surgery, you may have one visitor at bedside or within 10-15 minutes of [facility]  -Please wear warm socks when you arrive for your colonoscopy  -Remove all jewelry and leave any valuables before arriving the day of your procedure (all will have to be removed before leaving preop)  -You will need to arrive at 0700 on 3/31/2023 for your colonoscopy    -Feel free to contact us at: [phone] with any additional questions/concerns

## 2023-03-31 ENCOUNTER — HOSPITAL ENCOUNTER (OUTPATIENT)
Facility: SURGERY CENTER | Age: 53
Setting detail: HOSPITAL OUTPATIENT SURGERY
Discharge: HOME | End: 2023-03-31
Attending: INTERNAL MEDICINE | Admitting: INTERNAL MEDICINE
Payer: COMMERCIAL

## 2023-03-31 ENCOUNTER — ANESTHESIA EVENT (OUTPATIENT)
Dept: SURGERY | Facility: SURGERY CENTER | Age: 53
End: 2023-03-31
Payer: COMMERCIAL

## 2023-03-31 ENCOUNTER — ANESTHESIA (OUTPATIENT)
Dept: SURGERY | Facility: SURGERY CENTER | Age: 53
End: 2023-03-31
Payer: COMMERCIAL

## 2023-03-31 VITALS
BODY MASS INDEX: 38.65 KG/M2 | SYSTOLIC BLOOD PRESSURE: 117 MMHG | TEMPERATURE: 98.2 F | OXYGEN SATURATION: 96 % | DIASTOLIC BLOOD PRESSURE: 81 MMHG | HEART RATE: 83 BPM | HEIGHT: 65 IN | WEIGHT: 232 LBS | RESPIRATION RATE: 20 BRPM

## 2023-03-31 DIAGNOSIS — Z12.11 ENCOUNTER FOR SCREENING FOR MALIGNANT NEOPLASM OF COLON: ICD-10-CM

## 2023-03-31 PROCEDURE — 88305 TISSUE EXAM BY PATHOLOGIST: CPT | Performed by: INTERNAL MEDICINE

## 2023-03-31 PROCEDURE — 25010000002 PROPOFOL 10 MG/ML EMULSION: Performed by: ANESTHESIOLOGY

## 2023-03-31 PROCEDURE — 45380 COLONOSCOPY AND BIOPSY: CPT | Performed by: INTERNAL MEDICINE

## 2023-03-31 RX ORDER — SODIUM CHLORIDE, SODIUM LACTATE, POTASSIUM CHLORIDE, CALCIUM CHLORIDE 600; 310; 30; 20 MG/100ML; MG/100ML; MG/100ML; MG/100ML
30 INJECTION, SOLUTION INTRAVENOUS CONTINUOUS PRN
Status: DISCONTINUED | OUTPATIENT
Start: 2023-03-31 | End: 2023-03-31 | Stop reason: HOSPADM

## 2023-03-31 RX ORDER — PROPOFOL 10 MG/ML
VIAL (ML) INTRAVENOUS AS NEEDED
Status: DISCONTINUED | OUTPATIENT
Start: 2023-03-31 | End: 2023-03-31 | Stop reason: SURG

## 2023-03-31 RX ORDER — SODIUM CHLORIDE 0.9 % (FLUSH) 0.9 %
10 SYRINGE (ML) INJECTION AS NEEDED
Status: DISCONTINUED | OUTPATIENT
Start: 2023-03-31 | End: 2023-03-31 | Stop reason: HOSPADM

## 2023-03-31 RX ORDER — LIDOCAINE HYDROCHLORIDE 20 MG/ML
INJECTION, SOLUTION INFILTRATION; PERINEURAL AS NEEDED
Status: DISCONTINUED | OUTPATIENT
Start: 2023-03-31 | End: 2023-03-31 | Stop reason: SURG

## 2023-03-31 RX ORDER — SODIUM CHLORIDE 0.9 % (FLUSH) 0.9 %
3 SYRINGE (ML) INJECTION EVERY 12 HOURS SCHEDULED
Status: DISCONTINUED | OUTPATIENT
Start: 2023-03-31 | End: 2023-03-31 | Stop reason: HOSPADM

## 2023-03-31 RX ADMIN — PROPOFOL 150 MG: 10 INJECTION, EMULSION INTRAVENOUS at 08:28

## 2023-03-31 RX ADMIN — LIDOCAINE HYDROCHLORIDE 30 MG: 20 INJECTION, SOLUTION INFILTRATION; PERINEURAL at 08:28

## 2023-03-31 RX ADMIN — SODIUM CHLORIDE, POTASSIUM CHLORIDE, SODIUM LACTATE AND CALCIUM CHLORIDE 30 ML/HR: 600; 310; 30; 20 INJECTION, SOLUTION INTRAVENOUS at 07:22

## 2023-03-31 RX ADMIN — PROPOFOL 160 MCG/KG/MIN: 10 INJECTION, EMULSION INTRAVENOUS at 08:28

## 2023-03-31 NOTE — ANESTHESIA POSTPROCEDURE EVALUATION
Patient: Bob Guerrero    Procedure Summary     Date: 03/31/23 Room / Location: [facility]  / [facility]    Anesthesia Start: 0824 Anesthesia Stop: 0846    Procedure: COLONOSCOPY FOR SCREENING Diagnosis:       Encounter for screening for malignant neoplasm of colon      (Encounter for screening for malignant neoplasm of colon [Z12.11])    Surgeons: Bob Clemente MD Provider: Ever Bhatia MD    Anesthesia Type: MAC ASA Status: 3          Anesthesia Type: MAC    Vitals  Vitals Value Taken Time   /76 03/31/23 0845   Temp 36.8 °C (98.2 °F) 03/31/23 0845   Pulse 86 03/31/23 0845   Resp 18 03/31/23 0845   SpO2 96 % 03/31/23 0845           Post Anesthesia Care and Evaluation    Patient location during evaluation: bedside  Patient participation: complete - patient participated  Level of consciousness: responsive to light touch, responsive to verbal stimuli and sleepy but conscious  Pain score: 0  Pain management: adequate    Airway patency: patent  Anesthetic complications: No anesthetic complications  PONV Status: none  Cardiovascular status: acceptable and hemodynamically stable  Respiratory status: acceptable  Hydration status: acceptable

## 2023-03-31 NOTE — ANESTHESIA PREPROCEDURE EVALUATION
Anesthesia Evaluation     Patient summary reviewed and Nursing notes reviewed   no history of anesthetic complications:  NPO Solid Status: > 8 hours  NPO Liquid Status: > 2 hours           Airway   Mallampati: III  TM distance: >3 FB  Neck ROM: full  Possible difficult intubation and Large neck circumference  Dental - normal exam     Pulmonary - normal exam   (+) sleep apnea on CPAP,   (-) COPD, asthma, not a smoker, lung cancer  Cardiovascular - normal exam  Exercise tolerance: good (4-7 METS)    ECG reviewed  Rhythm: regular  Rate: normal    (+) hypertension well controlled less than 2 medications, dysrhythmias Tachycardia, hyperlipidemia,   (-) valvular problems/murmurs, past MI, CAD, cardiac stents, CABG    ROS comment:  SVT as a child, and underwent ablation in 1996 followed by a repeat ablation in 1997     Neuro/Psych  (+) numbness,    (-) seizures, TIA, CVA  GI/Hepatic/Renal/Endo    (+) morbid obesity, GERD well controlled,    (-) PUD, hepatitis, liver disease, no renal disease, diabetes, GI bleed, no thyroid disorder    Musculoskeletal (-) negative ROS    Abdominal   (+) obese,     Abdomen: soft.   Substance History - negative use     OB/GYN          Other - negative ROS                       Anesthesia Plan    ASA 3     MAC     intravenous induction     Anesthetic plan, risks, benefits, and alternatives have been provided, discussed and informed consent has been obtained with: patient.        CODE STATUS:

## 2023-03-31 NOTE — H&P
No chief complaint on file.      HPI  screening       Problem List:    Patient Active Problem List   Diagnosis   • Chronic fatigue   • Paresthesia of left arm and leg   • Left sacral radiculopathy   • Arthralgia   • Dry eyes   • Elevated blood-pressure reading without diagnosis of hypertension   • Malaise and fatigue   • Encounter for screening for malignant neoplasm of colon       Medical History:    Past Medical History:   Diagnosis Date   • Acid reflux    • Allergic 2000    Seasonal allergies   • Hyperlipidemia    • Morbid obesity with BMI of 40.0-44.9, adult ([facility])    • Sleep apnea    • SVT (supraventricular tachycardia) ([facility])     s/p ablation in 1996 and 1997   • Visual impairment     Dry eyes. I use Restasis eye drops twice a day        Social History:    Social History     Socioeconomic History   • Marital status:    • Number of children: 1   Tobacco Use   • Smoking status: Never   • Smokeless tobacco: Never   • Tobacco comments:     caffeine use: 4-5 cups daily and soft drinks throughout the day.    Vaping Use   • Vaping Use: Never used   Substance and Sexual Activity   • Alcohol use: Yes     Alcohol/week: 6.0 standard drinks     Types: 3 Cans of beer, 3 Shots of liquor per week     Comment: occ   • Drug use: No   • Sexual activity: Yes     Partners: Female       Family History:   Family History   Problem Relation Age of Onset   • Diabetes Mother    • Hypertension Mother    • Kidney disease Mother    • Liver disease Mother         Fatty Liver. My mom had Acsites   • No Known Problems Father    • Arthritis Maternal Grandmother    • Hypertension Maternal Grandmother    • Diabetes Maternal Grandmother    • Diabetes Maternal Grandfather        Surgical History:   Past Surgical History:   Procedure Laterality Date   • ABLATION OF DYSRHYTHMIC FOCUS      dx'd in 20s   • ADENOIDECTOMY  2000   • CARDIAC SURGERY  1996 and 1997    Catheter Ablation to treat Supraventricular Tachycardia   • TONSILLECTOMY    "        Current Facility-Administered Medications:   •  lactated ringers infusion, 30 mL/hr, Intravenous, Continuous PRN, Baron, Lindsey C, APRN  •  sodium chloride 0.9 % flush 10 mL, 10 mL, Intravenous, PRN, Baron, Lindsey C, APRN  •  sodium chloride 0.9 % flush 3 mL, 3 mL, Intravenous, Q12H, Baron, Lindsey C, APRN    Allergies:   Allergies   Allergen Reactions   • Cefdinir Other (See Comments)     \"Makes him not feel right\"        The following portions of the patient's history were reviewed by me and updated as appropriate: review of systems, allergies, current medications, past family history, past medical history, past social history, past surgical history and problem list.    There were no vitals filed for this visit.    PHYSICAL EXAM:    CONSTITUTIONAL:  today's vital signs reviewed by me  GASTROINTESTINAL: abdomen is soft nontender nondistended with normal active bowel sounds, no masses are appreciated    Assessment/ Plan  Screening    colonoscopy    Risks and benefits as well as alternatives to endoscopic evaluation were explained to the patient and they voiced understanding and wish to proceed.  These risks include but are not limited to the risk of bleeding, perforation, adverse reaction to sedation, and missed lesions.  The patient was given the opportunity to ask questions prior to the endoscopic procedure.          "

## 2023-04-03 LAB
LAB AP CASE REPORT: NORMAL
PATH REPORT.FINAL DX SPEC: NORMAL
PATH REPORT.GROSS SPEC: NORMAL

## 2023-04-05 NOTE — PROGRESS NOTES
From what I can tell we have never seen the patient in the office.  I would recommend scheduling an in office appointment with our staff to further assess.    Sarah Dill, KRISTA

## 2023-07-28 DIAGNOSIS — I10 ESSENTIAL HYPERTENSION: ICD-10-CM

## 2023-07-28 RX ORDER — HYDROCHLOROTHIAZIDE 25 MG/1
25 TABLET ORAL DAILY
Qty: 30 TABLET | Refills: 0 | Status: SHIPPED | OUTPATIENT
Start: 2023-07-28

## 2023-08-04 ENCOUNTER — OFFICE VISIT (OUTPATIENT)
Dept: FAMILY MEDICINE CLINIC | Facility: CLINIC | Age: 53
End: 2023-08-04
Payer: COMMERCIAL

## 2023-08-04 VITALS
TEMPERATURE: 98.5 F | HEIGHT: 65 IN | DIASTOLIC BLOOD PRESSURE: 86 MMHG | SYSTOLIC BLOOD PRESSURE: 142 MMHG | BODY MASS INDEX: 40.32 KG/M2 | HEART RATE: 73 BPM | WEIGHT: 242 LBS | OXYGEN SATURATION: 99 %

## 2023-08-04 DIAGNOSIS — Z00.01 ENCOUNTER FOR ANNUAL GENERAL MEDICAL EXAMINATION WITH ABNORMAL FINDINGS IN ADULT: Primary | ICD-10-CM

## 2023-08-04 DIAGNOSIS — E78.2 MIXED HYPERLIPIDEMIA: ICD-10-CM

## 2023-08-04 DIAGNOSIS — I10 ESSENTIAL HYPERTENSION: ICD-10-CM

## 2023-08-04 DIAGNOSIS — J01.01 ACUTE RECURRENT MAXILLARY SINUSITIS: ICD-10-CM

## 2023-08-04 DIAGNOSIS — Z12.5 PROSTATE CANCER SCREENING: ICD-10-CM

## 2023-08-04 PROBLEM — H40.89 OTHER SPECIFIED GLAUCOMA: Status: ACTIVE | Noted: 2023-08-04

## 2023-08-04 LAB
ALBUMIN SERPL-MCNC: 4.4 G/DL (ref 3.5–5.2)
ALBUMIN/GLOB SERPL: 1.7 G/DL
ALP SERPL-CCNC: 63 U/L (ref 39–117)
ALT SERPL-CCNC: 28 U/L (ref 1–41)
AST SERPL-CCNC: 19 U/L (ref 1–40)
BASOPHILS # BLD AUTO: 0.08 10*3/MM3 (ref 0–0.2)
BASOPHILS NFR BLD AUTO: 1.3 % (ref 0–1.5)
BILIRUB SERPL-MCNC: 1.4 MG/DL (ref 0–1.2)
BUN SERPL-MCNC: 12 MG/DL (ref 6–20)
BUN/CREAT SERPL: 11.7 (ref 7–25)
CALCIUM SERPL-MCNC: 9.8 MG/DL (ref 8.6–10.5)
CHLORIDE SERPL-SCNC: 97 MMOL/L (ref 98–107)
CHOLEST SERPL-MCNC: 273 MG/DL (ref 0–200)
CO2 SERPL-SCNC: 29.1 MMOL/L (ref 22–29)
CREAT SERPL-MCNC: 1.03 MG/DL (ref 0.76–1.27)
EGFRCR SERPLBLD CKD-EPI 2021: 87.4 ML/MIN/1.73
EOSINOPHIL # BLD AUTO: 0.11 10*3/MM3 (ref 0–0.4)
EOSINOPHIL NFR BLD AUTO: 1.9 % (ref 0.3–6.2)
ERYTHROCYTE [DISTWIDTH] IN BLOOD BY AUTOMATED COUNT: 12.4 % (ref 12.3–15.4)
GLOBULIN SER CALC-MCNC: 2.6 GM/DL
GLUCOSE SERPL-MCNC: 110 MG/DL (ref 65–99)
HCT VFR BLD AUTO: 47.9 % (ref 37.5–51)
HDLC SERPL-MCNC: 49 MG/DL (ref 40–60)
HGB BLD-MCNC: 16.4 G/DL (ref 13–17.7)
IMM GRANULOCYTES # BLD AUTO: 0.03 10*3/MM3 (ref 0–0.05)
IMM GRANULOCYTES NFR BLD AUTO: 0.5 % (ref 0–0.5)
LDLC SERPL CALC-MCNC: 189 MG/DL (ref 0–100)
LYMPHOCYTES # BLD AUTO: 2.05 10*3/MM3 (ref 0.7–3.1)
LYMPHOCYTES NFR BLD AUTO: 34.5 % (ref 19.6–45.3)
MCH RBC QN AUTO: 30.2 PG (ref 26.6–33)
MCHC RBC AUTO-ENTMCNC: 34.2 G/DL (ref 31.5–35.7)
MCV RBC AUTO: 88.2 FL (ref 79–97)
MONOCYTES # BLD AUTO: 0.71 10*3/MM3 (ref 0.1–0.9)
MONOCYTES NFR BLD AUTO: 12 % (ref 5–12)
NEUTROPHILS # BLD AUTO: 2.96 10*3/MM3 (ref 1.7–7)
NEUTROPHILS NFR BLD AUTO: 49.8 % (ref 42.7–76)
NRBC BLD AUTO-RTO: 0 /100 WBC (ref 0–0.2)
PLATELET # BLD AUTO: 266 10*3/MM3 (ref 140–450)
POTASSIUM SERPL-SCNC: 3.3 MMOL/L (ref 3.5–5.2)
PROT SERPL-MCNC: 7 G/DL (ref 6–8.5)
PSA SERPL-MCNC: 0.86 NG/ML (ref 0–4)
RBC # BLD AUTO: 5.43 10*6/MM3 (ref 4.14–5.8)
SODIUM SERPL-SCNC: 138 MMOL/L (ref 136–145)
TRIGL SERPL-MCNC: 187 MG/DL (ref 0–150)
TSH SERPL DL<=0.005 MIU/L-ACNC: 2.88 UIU/ML (ref 0.27–4.2)
VLDLC SERPL CALC-MCNC: 35 MG/DL (ref 5–40)
WBC # BLD AUTO: 5.94 10*3/MM3 (ref 3.4–10.8)

## 2023-08-04 PROCEDURE — 99396 PREV VISIT EST AGE 40-64: CPT | Performed by: FAMILY MEDICINE

## 2023-08-04 RX ORDER — AMOXICILLIN 500 MG/1
1000 CAPSULE ORAL 2 TIMES DAILY
Qty: 28 CAPSULE | Refills: 0 | Status: SHIPPED | OUTPATIENT
Start: 2023-08-04 | End: 2023-08-11

## 2023-08-04 RX ORDER — HYDROCHLOROTHIAZIDE 25 MG/1
25 TABLET ORAL DAILY
Qty: 90 TABLET | Refills: 0 | Status: SHIPPED | OUTPATIENT
Start: 2023-08-04

## 2023-08-08 DIAGNOSIS — E78.2 MIXED HYPERLIPIDEMIA: ICD-10-CM

## 2023-08-08 DIAGNOSIS — I10 ESSENTIAL HYPERTENSION: Primary | ICD-10-CM

## 2023-08-08 RX ORDER — POTASSIUM CHLORIDE 750 MG/1
10 CAPSULE, EXTENDED RELEASE ORAL DAILY
Qty: 90 CAPSULE | Refills: 1 | Status: SHIPPED | OUTPATIENT
Start: 2023-08-08

## 2023-08-08 RX ORDER — ATORVASTATIN CALCIUM 10 MG/1
10 TABLET, FILM COATED ORAL DAILY
Qty: 90 TABLET | Refills: 1 | Status: SHIPPED | OUTPATIENT
Start: 2023-08-08 | End: 2023-08-08 | Stop reason: SDUPTHER

## 2023-08-08 RX ORDER — ATORVASTATIN CALCIUM 10 MG/1
10 TABLET, FILM COATED ORAL DAILY
Qty: 90 TABLET | Refills: 1 | Status: SHIPPED | OUTPATIENT
Start: 2023-08-08

## 2023-08-08 NOTE — TELEPHONE ENCOUNTER
PATIENT WANTS SCRIPT  SENT TO [facility]    ALSO NEED HIS POTASSIUM CALLED IN WHEN YOU RESEND THE ATORVASTATIN

## 2023-11-07 ENCOUNTER — OFFICE VISIT (OUTPATIENT)
Dept: FAMILY MEDICINE CLINIC | Facility: CLINIC | Age: 53
End: 2023-11-07
Payer: COMMERCIAL

## 2023-11-07 VITALS
HEIGHT: 65 IN | SYSTOLIC BLOOD PRESSURE: 132 MMHG | DIASTOLIC BLOOD PRESSURE: 86 MMHG | TEMPERATURE: 98 F | WEIGHT: 236 LBS | HEART RATE: 102 BPM | BODY MASS INDEX: 39.32 KG/M2 | OXYGEN SATURATION: 98 %

## 2023-11-07 DIAGNOSIS — E87.6 HYPOKALEMIA: ICD-10-CM

## 2023-11-07 DIAGNOSIS — I10 ESSENTIAL HYPERTENSION: Primary | ICD-10-CM

## 2023-11-07 DIAGNOSIS — E78.2 MIXED HYPERLIPIDEMIA: ICD-10-CM

## 2023-11-07 NOTE — PROGRESS NOTES
"Chief Complaint  Med Refill and Hypertension    Subjective        Bob Guerrero presents to [facility] PRIMARY [facility]ARE  History of Present Illness  Here for recheck blood pressure.  At home 120s-140s.  Compliant with medication without side effects.  He has been taking the potassium since it was low last time I saw him.        Objective   Vital Signs:  /86   Pulse 102   Temp 98 °F (36.7 °C)   Ht 165.1 cm (65\")   Wt 107 kg (236 lb)   SpO2 98%   BMI 39.27 kg/m²   Estimated body mass index is 39.27 kg/m² as calculated from the following:    Height as of this encounter: 165.1 cm (65\").    Weight as of this encounter: 107 kg (236 lb).               Physical Exam  Constitutional:       General: He is not in acute distress.     Appearance: He is well-developed. He is obese.   HENT:      Head: Normocephalic and atraumatic.      Right Ear: Tympanic membrane, ear canal and external ear normal.      Left Ear: Tympanic membrane, ear canal and external ear normal.      Nose: Nose normal.      Mouth/Throat:      Mouth: Mucous membranes are moist.      Pharynx: Oropharynx is clear.   Eyes:      Conjunctiva/sclera: Conjunctivae normal.      Pupils: Pupils are equal, round, and reactive to light.   Cardiovascular:      Rate and Rhythm: Normal rate and regular rhythm.      Heart sounds: No murmur heard.  Pulmonary:      Effort: Pulmonary effort is normal. No respiratory distress.      Breath sounds: Normal breath sounds.   Musculoskeletal:      Cervical back: Neck supple.      Right lower leg: No edema.      Left lower leg: No edema.   Lymphadenopathy:      Cervical: No cervical adenopathy.   Neurological:      Mental Status: He is alert and oriented to person, place, and time.   Psychiatric:         Mood and Affect: Mood normal.         Behavior: Behavior normal.        Result Review :          PSA Screen (08/04/2023 10:31)  CBC & Differential (08/04/2023 10:31)  Comprehensive Metabolic Panel " (08/04/2023 10:31)  Lipid Panel (08/04/2023 10:31)  TSH (08/04/2023 10:31)         Assessment and Plan   Diagnoses and all orders for this visit:    1. Essential hypertension (Primary)  -     Cancel: Basic Metabolic Panel  -     Comprehensive Metabolic Panel    2. Hypokalemia  -     Cancel: Basic Metabolic Panel  -     Comprehensive Metabolic Panel    3. Mixed hyperlipidemia  -     Lipid Panel  -     Comprehensive Metabolic Panel    Other orders  -     Fluzone >6 Months (4370-1674)    Hypertension-continue current medication recheck labs, watch blood pressure at home and let me know if it keeps running greater than 140 I will adjust his medication next time  Hyperlipidemia-recheck today         Follow Up   No follow-ups on file.  Patient was given instructions and counseling regarding his condition or for health maintenance advice. Please see specific information pulled into the AVS if appropriate.         Answers submitted by the patient for this visit:  Primary Reason for Visit (Submitted on 11/6/2023)  What is the primary reason for your visit?: Other  Other (Submitted on 11/6/2023)  Please describe your symptoms.: Follow up appointment from physical.  Have you had these symptoms before?: Yes  How long have you been having these symptoms?: Greater than 2 weeks

## 2023-11-08 LAB
ALBUMIN SERPL-MCNC: 4.5 G/DL (ref 3.8–4.9)
ALBUMIN/GLOB SERPL: 1.8 {RATIO} (ref 1.2–2.2)
ALP SERPL-CCNC: 65 IU/L (ref 44–121)
ALT SERPL-CCNC: 24 IU/L (ref 0–44)
AST SERPL-CCNC: 20 IU/L (ref 0–40)
BILIRUB SERPL-MCNC: 1.4 MG/DL (ref 0–1.2)
BUN SERPL-MCNC: 15 MG/DL (ref 6–24)
BUN/CREAT SERPL: 13 (ref 9–20)
CALCIUM SERPL-MCNC: 9.4 MG/DL (ref 8.7–10.2)
CHLORIDE SERPL-SCNC: 100 MMOL/L (ref 96–106)
CHOLEST SERPL-MCNC: 175 MG/DL (ref 100–199)
CO2 SERPL-SCNC: 27 MMOL/L (ref 20–29)
CREAT SERPL-MCNC: 1.13 MG/DL (ref 0.76–1.27)
EGFRCR SERPLBLD CKD-EPI 2021: 78 ML/MIN/1.73
GLOBULIN SER CALC-MCNC: 2.5 G/DL (ref 1.5–4.5)
GLUCOSE SERPL-MCNC: 116 MG/DL (ref 70–99)
HDLC SERPL-MCNC: 45 MG/DL
LDLC SERPL CALC-MCNC: 105 MG/DL (ref 0–99)
POTASSIUM SERPL-SCNC: 3.6 MMOL/L (ref 3.5–5.2)
PROT SERPL-MCNC: 7 G/DL (ref 6–8.5)
SODIUM SERPL-SCNC: 142 MMOL/L (ref 134–144)
TRIGL SERPL-MCNC: 138 MG/DL (ref 0–149)
VLDLC SERPL CALC-MCNC: 25 MG/DL (ref 5–40)

## 2023-11-22 ENCOUNTER — APPOINTMENT (OUTPATIENT)
Dept: GENERAL RADIOLOGY | Facility: HOSPITAL | Age: 53
End: 2023-11-22
Payer: COMMERCIAL

## 2023-11-22 ENCOUNTER — HOSPITAL ENCOUNTER (EMERGENCY)
Facility: HOSPITAL | Age: 53
Discharge: HOME | End: 2023-11-23
Attending: EMERGENCY MEDICINE
Payer: COMMERCIAL

## 2023-11-22 DIAGNOSIS — S20.212A CONTUSION OF LEFT CHEST WALL, INITIAL ENCOUNTER: Primary | ICD-10-CM

## 2023-11-22 PROCEDURE — 71101 X-RAY EXAM UNILAT RIBS/CHEST: CPT

## 2023-11-22 PROCEDURE — 99283 EMERGENCY DEPT VISIT LOW MDM: CPT

## 2023-11-23 VITALS
RESPIRATION RATE: 16 BRPM | SYSTOLIC BLOOD PRESSURE: 132 MMHG | HEIGHT: 65 IN | BODY MASS INDEX: 39.32 KG/M2 | OXYGEN SATURATION: 97 % | WEIGHT: 236 LBS | DIASTOLIC BLOOD PRESSURE: 85 MMHG | TEMPERATURE: 98.2 F | HEART RATE: 82 BPM

## 2023-11-23 PROCEDURE — 99284 EMERGENCY DEPT VISIT MOD MDM: CPT | Performed by: EMERGENCY MEDICINE

## 2023-11-23 RX ORDER — LIDOCAINE 4 G/G
1 PATCH TOPICAL
Status: DISCONTINUED | OUTPATIENT
Start: 2023-11-23 | End: 2023-11-23 | Stop reason: HOSPADM

## 2023-11-23 RX ADMIN — LIDOCAINE 1 PATCH: 4 PATCH TOPICAL at 00:40
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
## 2023-11-23 NOTE — DISCHARGE INSTRUCTIONS
Return ED chest pain, shortness of air, abdominal pain, vomiting, worse condition, any other concerns

## 2023-11-23 NOTE — FSED PROVIDER NOTE
"Subjective   History of Present Illness  54yo male pmh significant hyperlipidemia/htn presents ED c/o left anterior chest wall pain secondary to being struck in chest by a hammad (male sheep) prior to arrival.  ROS neg soa/syncope/abdominal pain/vomiting.    History provided by:  Patient  Chest Pain  Pain location:  L chest  Pain quality: aching        Review of Systems   Constitutional: Negative.    HENT: Negative.     Eyes: Negative.    Respiratory: Negative.     Cardiovascular:  Positive for chest pain.   Gastrointestinal: Negative.    Genitourinary: Negative.    Musculoskeletal: Negative.    Allergic/Immunologic: Negative for immunocompromised state.   Neurological:  Negative for syncope.   All other systems reviewed and are negative.      Past Medical History:   Diagnosis Date    Acid reflux     Allergic 2000    Seasonal allergies    Glaucoma     Hyperlipidemia     Hypertension September 2022    Morbid obesity with BMI of 40.0-44.9, adult     Sleep apnea     SVT (supraventricular tachycardia)     s/p ablation in 1996 and 1997    Visual impairment     Dry eyes. I use Restasis eye drops twice a day       Allergies   Allergen Reactions    Cefdinir Other (See Comments)     \"Makes him not feel right\"       Past Surgical History:   Procedure Laterality Date    ABLATION OF DYSRHYTHMIC FOCUS      dx'd in 20s    ADENOIDECTOMY  2000    CARDIAC SURGERY  1996 and 1997    Catheter Ablation to treat Supraventricular Tachycardia    COLONOSCOPY N/A 3/31/2023    Procedure: COLONOSCOPY FOR SCREENING;  Surgeon: Bob Clemente MD;  Location: [facility] MAIN OR;  Service: Gastroenterology;  Laterality: N/A;  Inflammation at 25cm , Diverticulosis, Hemorrhoids    TONSILLECTOMY         Family History   Problem Relation Age of Onset    Diabetes Mother     Hypertension Mother     Kidney disease Mother     Liver disease Mother         Fatty Liver. My mom had Acsites    No Known Problems Father     Arthritis Maternal Grandmother     " Hypertension Maternal Grandmother     Diabetes Maternal Grandmother     Diabetes Maternal Grandfather        Social History     Socioeconomic History    Marital status:     Number of children: 1   Tobacco Use    Smoking status: Never    Smokeless tobacco: Never    Tobacco comments:     caffeine use: 4-5 cups daily and soft drinks throughout the day.    Vaping Use    Vaping Use: Never used   Substance and Sexual Activity    Alcohol use: Yes     Alcohol/week: 6.0 standard drinks of alcohol     Types: 3 Cans of beer, 3 Shots of liquor per week     Comment: occ    Drug use: Never    Sexual activity: Yes     Partners: Female           Objective   Physical Exam  Vitals and nursing note reviewed.   Constitutional:       Appearance: Normal appearance.   HENT:      Head: Normocephalic and atraumatic.      Right Ear: External ear normal.      Left Ear: External ear normal.      Nose: Nose normal.      Mouth/Throat:      Mouth: Mucous membranes are moist.      Pharynx: Oropharynx is clear.   Eyes:      Pupils: Pupils are equal, round, and reactive to light.   Cardiovascular:      Rate and Rhythm: Normal rate and regular rhythm.      Pulses: Normal pulses.      Heart sounds: Normal heart sounds. No murmur heard.     No friction rub. No gallop.   Pulmonary:      Effort: Pulmonary effort is normal. No respiratory distress.      Breath sounds: Normal breath sounds. No wheezing or rales.   Chest:      Chest wall: Tenderness and crepitus present. No mass, lacerations, deformity or swelling.       Abdominal:      General: Bowel sounds are normal. There is no distension.      Palpations: Abdomen is soft.      Tenderness: There is no abdominal tenderness. There is no guarding or rebound.   Musculoskeletal:      Cervical back: Normal range of motion and neck supple. No rigidity.      Right lower leg: No edema.      Left lower leg: No edema.   Lymphadenopathy:      Cervical: No cervical adenopathy.   Skin:     General: Skin is  warm and dry.   Neurological:      General: No focal deficit present.      Mental Status: He is alert and oriented to person, place, and time.      GCS: GCS eye subscore is 4. GCS verbal subscore is 5. GCS motor subscore is 6.         Procedures           ED Course      XR Ribs Left With PA Chest    Result Date: 11/23/2023  Narrative: Patient: JED STERN  Time Out: 00:22 Exam(s): XR RIBS, XR CXR 1 VIEW EXAM:   XR Left Ribs and AP Chest, 3 or More Views CLINICAL HISTORY:    Reason for exam: chest wall pain. TECHNIQUE:   Frontal and oblique views of the left ribs and frontal view of the chest. COMPARISON:   No relevant prior studies available. FINDINGS:   Lungs:  Unremarkable.  No consolidation.   Pleural space:  Unremarkable.  No pneumothorax.   Heart:  Unremarkable.  No cardiomegaly.   Mediastinum:  Unremarkable.  Normal mediastinal contour.   Bones joints:  Unremarkable.  No acute fracture. IMPRESSION:       Normal left rib x-rays. _______________________________________________ EXAM:   XR Chest, 1 View CLINICAL HISTORY:    Reason for exam: chest wall pain. TECHNIQUE:   Frontal view of the chest. COMPARISON:   No relevant prior studies available. FINDINGS:   Lungs:  Unremarkable.  No consolidation.   Pleural space:  Unremarkable.  No pneumothorax.   Heart:  Unremarkable.  No cardiomegaly.   Mediastinum:  Unremarkable.  Normal mediastinal contour.   Bones joints:  Unremarkable.  No acute fracture. IMPRESSION:       Normal chest x-ray.     Impression: Electronically signed by Rico Montalvo MD on 11-23-23 at 0022                                        Medical Decision Making  Problems Addressed:  Contusion of left chest wall, initial encounter: complicated acute illness or injury    Amount and/or Complexity of Data Reviewed  Radiology: ordered.    Risk  OTC drugs.  Prescription drug management.        Final diagnoses:   Contusion of left chest wall, initial encounter       ED Disposition  ED Disposition        ED Disposition   Discharge    Condition   Good    Comment   --               Kehrer, Meredith Lea, MD  [address]  [address]  [facility] [id number] [phone]    In 2 days           Medication List        New Prescriptions      diclofenac 50 MG EC tablet  Commonly known as: VOLTAREN  Take 1 tablet by mouth 3 (Three) Times a Day As Needed (pain).               Where to Get Your Medications        These medications were sent to [facility] [place] - [place], [place] - [address] - [phone]  - [phone] [facility] [facility] [address]      Phone: [phone]   diclofenac 50 MG EC tablet

## 2023-12-03 DIAGNOSIS — I10 ESSENTIAL HYPERTENSION: ICD-10-CM

## 2023-12-04 RX ORDER — HYDROCHLOROTHIAZIDE 25 MG/1
25 TABLET ORAL DAILY
Qty: 90 TABLET | Refills: 0 | Status: SHIPPED | OUTPATIENT
Start: 2023-12-04

## 2024-01-24 RX ORDER — POTASSIUM CHLORIDE 750 MG/1
10 CAPSULE, EXTENDED RELEASE ORAL DAILY
Qty: 90 CAPSULE | Refills: 1 | Status: SHIPPED | OUTPATIENT
Start: 2024-01-24

## (undated) DEVICE — VIAL FORMLN CAP 10PCT 20ML

## (undated) DEVICE — SYRINGE, LUER SLIP, STERILE, 60ML: Brand: MEDLINE

## (undated) DEVICE — FLEX ADVANTAGE 1500CC: Brand: FLEX ADVANTAGE

## (undated) DEVICE — KT ORCA ORCAPOD DISP STRL

## (undated) DEVICE — Device

## (undated) DEVICE — CANN NASL CO2 TRULINK W/O2 A/

## (undated) DEVICE — SINGLE-USE BIOPSY FORCEPS: Brand: RADIAL JAW 4

## (undated) DEVICE — GOWN PROC ENDOARMOR GI LVL3 HY/SHLD UNIV